# Patient Record
Sex: MALE | Race: ASIAN | NOT HISPANIC OR LATINO | URBAN - METROPOLITAN AREA
[De-identification: names, ages, dates, MRNs, and addresses within clinical notes are randomized per-mention and may not be internally consistent; named-entity substitution may affect disease eponyms.]

---

## 2024-07-27 ENCOUNTER — INPATIENT (INPATIENT)
Facility: HOSPITAL | Age: 89
LOS: 4 days | Discharge: NURSING, REHAB OR LONG-TERM CARE | DRG: 312 | End: 2024-08-01
Attending: STUDENT IN AN ORGANIZED HEALTH CARE EDUCATION/TRAINING PROGRAM | Admitting: STUDENT IN AN ORGANIZED HEALTH CARE EDUCATION/TRAINING PROGRAM
Payer: MEDICARE

## 2024-07-27 VITALS
HEIGHT: 62 IN | WEIGHT: 134.92 LBS | HEART RATE: 77 BPM | DIASTOLIC BLOOD PRESSURE: 63 MMHG | TEMPERATURE: 98 F | RESPIRATION RATE: 16 BRPM | OXYGEN SATURATION: 95 % | SYSTOLIC BLOOD PRESSURE: 133 MMHG

## 2024-07-27 DIAGNOSIS — R55 SYNCOPE AND COLLAPSE: ICD-10-CM

## 2024-07-27 DIAGNOSIS — C61 MALIGNANT NEOPLASM OF PROSTATE: ICD-10-CM

## 2024-07-27 DIAGNOSIS — E78.5 HYPERLIPIDEMIA, UNSPECIFIED: ICD-10-CM

## 2024-07-27 DIAGNOSIS — E11.9 TYPE 2 DIABETES MELLITUS WITHOUT COMPLICATIONS: ICD-10-CM

## 2024-07-27 DIAGNOSIS — Z29.9 ENCOUNTER FOR PROPHYLACTIC MEASURES, UNSPECIFIED: ICD-10-CM
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
## 2024-07-27 LAB
ALBUMIN SERPL ELPH-MCNC: 3.5 G/DL — SIGNIFICANT CHANGE UP (ref 3.5–5)
ALP SERPL-CCNC: 61 U/L — SIGNIFICANT CHANGE UP (ref 40–120)
ALT FLD-CCNC: 25 U/L DA — SIGNIFICANT CHANGE UP (ref 10–60)
ANION GAP SERPL CALC-SCNC: 5 MMOL/L — SIGNIFICANT CHANGE UP (ref 5–17)
APPEARANCE UR: CLEAR — SIGNIFICANT CHANGE UP
AST SERPL-CCNC: 27 U/L — SIGNIFICANT CHANGE UP (ref 10–40)
BASOPHILS # BLD AUTO: 0.02 K/UL — SIGNIFICANT CHANGE UP (ref 0–0.2)
BASOPHILS NFR BLD AUTO: 0.1 % — SIGNIFICANT CHANGE UP (ref 0–2)
BILIRUB SERPL-MCNC: 0.8 MG/DL — SIGNIFICANT CHANGE UP (ref 0.2–1.2)
BILIRUB UR-MCNC: NEGATIVE — SIGNIFICANT CHANGE UP
BUN SERPL-MCNC: 35 MG/DL — HIGH (ref 7–18)
CALCIUM SERPL-MCNC: 8.9 MG/DL — SIGNIFICANT CHANGE UP (ref 8.4–10.5)
CHLORIDE SERPL-SCNC: 112 MMOL/L — HIGH (ref 96–108)
CO2 SERPL-SCNC: 24 MMOL/L — SIGNIFICANT CHANGE UP (ref 22–31)
COLOR SPEC: YELLOW — SIGNIFICANT CHANGE UP
CREAT SERPL-MCNC: 1.32 MG/DL — HIGH (ref 0.5–1.3)
DIFF PNL FLD: ABNORMAL
EGFR: 50 ML/MIN/1.73M2 — LOW
EOSINOPHIL # BLD AUTO: 0 K/UL — SIGNIFICANT CHANGE UP (ref 0–0.5)
EOSINOPHIL NFR BLD AUTO: 0 % — SIGNIFICANT CHANGE UP (ref 0–6)
EPI CELLS # UR: PRESENT
GLUCOSE SERPL-MCNC: 191 MG/DL — HIGH (ref 70–99)
GLUCOSE UR QL: NEGATIVE MG/DL — SIGNIFICANT CHANGE UP
HCT VFR BLD CALC: 37 % — LOW (ref 39–50)
HGB BLD-MCNC: 12.6 G/DL — LOW (ref 13–17)
IMM GRANULOCYTES NFR BLD AUTO: 0.5 % — SIGNIFICANT CHANGE UP (ref 0–0.9)
KETONES UR-MCNC: ABNORMAL MG/DL
LEUKOCYTE ESTERASE UR-ACNC: NEGATIVE — SIGNIFICANT CHANGE UP
LYMPHOCYTES # BLD AUTO: 0.47 K/UL — LOW (ref 1–3.3)
LYMPHOCYTES # BLD AUTO: 3.1 % — LOW (ref 13–44)
MAGNESIUM SERPL-MCNC: 2.3 MG/DL — SIGNIFICANT CHANGE UP (ref 1.6–2.6)
MANUAL SMEAR VERIFICATION: SIGNIFICANT CHANGE UP
MCHC RBC-ENTMCNC: 32.4 PG — SIGNIFICANT CHANGE UP (ref 27–34)
MCHC RBC-ENTMCNC: 34.1 GM/DL — SIGNIFICANT CHANGE UP (ref 32–36)
MCV RBC AUTO: 95.1 FL — SIGNIFICANT CHANGE UP (ref 80–100)
MONOCYTES # BLD AUTO: 0.88 K/UL — SIGNIFICANT CHANGE UP (ref 0–0.9)
MONOCYTES NFR BLD AUTO: 5.7 % — SIGNIFICANT CHANGE UP (ref 2–14)
NEUTROPHILS # BLD AUTO: 13.9 K/UL — HIGH (ref 1.8–7.4)
NEUTROPHILS NFR BLD AUTO: 90.6 % — HIGH (ref 43–77)
NITRITE UR-MCNC: NEGATIVE — SIGNIFICANT CHANGE UP
NRBC # BLD: 0 /100 WBCS — SIGNIFICANT CHANGE UP (ref 0–0)
PH UR: 5.5 — SIGNIFICANT CHANGE UP (ref 5–8)
PLAT MORPH BLD: NORMAL — SIGNIFICANT CHANGE UP
PLATELET # BLD AUTO: 203 K/UL — SIGNIFICANT CHANGE UP (ref 150–400)
POTASSIUM SERPL-MCNC: 3.9 MMOL/L — SIGNIFICANT CHANGE UP (ref 3.5–5.3)
POTASSIUM SERPL-SCNC: 3.9 MMOL/L — SIGNIFICANT CHANGE UP (ref 3.5–5.3)
PROT SERPL-MCNC: 7 G/DL — SIGNIFICANT CHANGE UP (ref 6–8.3)
PROT UR-MCNC: ABNORMAL MG/DL
RBC # BLD: 3.89 M/UL — LOW (ref 4.2–5.8)
RBC # FLD: 13.1 % — SIGNIFICANT CHANGE UP (ref 10.3–14.5)
RBC BLD AUTO: NORMAL — SIGNIFICANT CHANGE UP
RBC CASTS # UR COMP ASSIST: 1 /HPF — SIGNIFICANT CHANGE UP (ref 0–4)
SODIUM SERPL-SCNC: 141 MMOL/L — SIGNIFICANT CHANGE UP (ref 135–145)
SP GR SPEC: 1.02 — SIGNIFICANT CHANGE UP (ref 1–1.03)
TROPONIN I, HIGH SENSITIVITY RESULT: 15.5 NG/L — SIGNIFICANT CHANGE UP
URATE CRY FLD QL MICRO: PRESENT
UROBILINOGEN FLD QL: 0.2 MG/DL — SIGNIFICANT CHANGE UP (ref 0.2–1)
WBC # BLD: 15.35 K/UL — HIGH (ref 3.8–10.5)
WBC # FLD AUTO: 15.35 K/UL — HIGH (ref 3.8–10.5)
WBC UR QL: 2 /HPF — SIGNIFICANT CHANGE UP (ref 0–5)

## 2024-07-27 PROCEDURE — 72170 X-RAY EXAM OF PELVIS: CPT | Mod: 26

## 2024-07-27 PROCEDURE — 99285 EMERGENCY DEPT VISIT HI MDM: CPT

## 2024-07-27 PROCEDURE — 70450 CT HEAD/BRAIN W/O DYE: CPT | Mod: 26,MC

## 2024-07-27 PROCEDURE — 99223 1ST HOSP IP/OBS HIGH 75: CPT | Mod: GC

## 2024-07-27 RX ORDER — BACTERIOSTATIC SODIUM CHLORIDE 0.9 %
1000 VIAL (ML) INJECTION ONCE
Refills: 0 | Status: COMPLETED | OUTPATIENT
Start: 2024-07-27 | End: 2024-07-27

## 2024-07-27 RX ADMIN — Medication 1000 MILLILITER(S): at 16:15

## 2024-07-27 NOTE — ED PROVIDER NOTE - OBJECTIVE STATEMENT
89-year-old male presenting after a fall.  Patient reports he was bending down to pick something up when he fell to the ground.  Denies being dizzy having any chest pain or trouble breathing.  States he currently has pain in his left forearm.  Denies being on any blood thinning medication. 89-year-old male, htn, DM, prostate cancer, presenting after a fall.  Patient reports he was bending down to pick something up when he fell to the ground.  Denies being dizzy having any chest pain or trouble breathing.  States he currently has pain in his left forearm.  Denies being on any blood thinning medication.

## 2024-07-27 NOTE — H&P ADULT - NSHPPHYSICALEXAM_GEN_ALL_CORE
General: well appearing male, no acute distress   	HEENT: normocephalic, atraumatic   	Respiratory: normal work of breathing  	Cardiac: regular rate and rhythm   	Abdomen: soft, non-tender, no guarding or rebound   	MSK: left forearm ecchymosis, non-tender, full left wrist and elbow ROM, pelvis stable, 5/5 strength in all extremities   	Skin: warm, dry   	Neuro: A&Ox3  Psych: appropriate affect General: well appearing male, no acute distress   HEENT: normocephalic, atraumatic   Respiratory: normal work of breathing  Cardiac: regular rate and rhythm   Abdomen: soft, non-tender, no guarding or rebound   MSK: R forearm ecchymosis, non-tender, full left wrist and elbow ROM, pelvis stable, 5/5 strength in all extremities   Skin: warm, dry   Neuro: A&Ox3  Psych: appropriate affect

## 2024-07-27 NOTE — H&P ADULT - HISTORY OF PRESENT ILLNESS
89-year-old male, htn, DM, prostate cancer, presenting after a fall.  Patient reports he was bending down to pick something up when he fell to the ground.  Denies being dizzy having any chest pain or trouble breathing.  States he currently has pain in his left forearm.  Denies being on any blood thinning medication. neighbor feels patient is not at his baseline and appeared confused. will admit patient for syncope workup. will have family member come to hospital tomorrow to assess the patient. RECENTLY started new medication?   Pt is a 89-year-old male, AAOx3, ambulates with a cane intermittently, w/ PMHx of DM, prostate cancer, presenting to the ED after 2 falls at home and near the grocery store earlier today. Per pt, he bent over to  a coin that fell to the ground and fell the first instance of falling. Later on in the day, he was bending down with his grocery bags and similarly fell again  He denies any dizziness, vertigo, lightheadedness, syncope, or LOC. Does endorse hitting his R forearm, where minor ecchymosis is noted on exam. However, ROM is fully intact. On exam, patient is comfortable when laying down, however on multiple attempts to ambulate, patient has unsteady gait. Romberg sign +. Denies any neuropathy. Pt has daughter who helps manage his care, she is planning to come in the next 2 days to take pt to [place] for further workup of his prostate cancer. Admitted to medicine for ambulatory dysfunction s/p fall.

## 2024-07-27 NOTE — ED ADULT NURSE NOTE - OBJECTIVE STATEMENT
Patient presented to the ED complaining of fall today. Patient states he was picking up something when he fell forward. Unknown if head strike. Patient denies dizziness or LOC.

## 2024-07-27 NOTE — H&P ADULT - NSICDXPASTMEDICALHX_GEN_ALL_CORE_FT
PAST MEDICAL HISTORY:  Diabetes mellitus     Hyperlipidemia     Prophylactic measure     Prostate cancer

## 2024-07-27 NOTE — ED ADULT NURSE NOTE - NSFALLHARMRISKINTERV_ED_ALL_ED

## 2024-07-27 NOTE — H&P ADULT - PROBLEM SELECTOR PLAN 1
s/p fall  imaging unremarkable  ROmber sign +  f/u orthostatic vs  f/u PT eval in AM  no syncope s/p fall x2  CTH: Small left frontal extracalvarial hematoma. if patient demonstrates persistent headaches or altered mental status, consider further evaluation  Pelvic XR: no remarkable findings, lower lumbar spine degeneration  on exam, Romberg sign +, unsteady gait  f/u orthostatic VS  f/u PT eval in AM  no evidence of syncope at this time

## 2024-07-27 NOTE — ED PROVIDER NOTE - CLINICAL SUMMARY MEDICAL DECISION MAKING FREE TEXT BOX
89M presenting after a fall. patient well appearing, grossly non-focal neurological exam. spoke with patient's daughter on the phone. patient has started a new medication. plans to return to [place] on Wednesday to get the patient. will obtain labs to assess for electrolyte abnormalities, ekg, ct head and xray pelvis.     daughters cell: [phone]

## 2024-07-27 NOTE — H&P ADULT - ATTENDING COMMENTS
IMAGING  CT Head  IMPRESSION:  Small left frontal extracalvarial hematoma. No acute intracranial hemorrhage, mass effect, or midline shift. If patient demonstrates persistent headaches or altered mental status, consider further evaluation via MR imaging to include DWI and ADC mapping techniques, along with gradient echo acquisition technique, provided there are no contraindications.    Pelvic X-Ray  IMPRESSION: No acute finding. Probable bone island in the right iliac bone. Degeneration particularly in the lower lumbar spine.    EKG  Sinus Rhythm with PACs, IRBBB, 63 bpm, QTc 421ms, NH 140ms, on my read no ST segment elevation or depression, no TWI    HPI  89 year old male patient with pmhx DM, prostate cancer, HTN, HLD who presented to the ER after falling twice.  The patient was at home and bent down to  a coin and fell forward. He later went to the store and coming home he was bending down with his grocery bags and again fell. He was found by someone who helped him back home by giving him a ride. His neighbor is a nurse and told him to go to the ER since he fell twice. The patient denies any lightheadedness, dizziness, orthostatic lightheadedness, palpitations, or any unusual signs before falling. He states he has imbalance chronically. In the ER attempted to ambulate patient but he almost fell after only 2 steps.    Review Of Systems included: + fall,   No lightheadedness, no dizziness, no orthostatic lightheadedness, no palpitations, no dysuria, no urinary frequency, no chest pain, no shortness of breath, no numbness, no weakness, no loss of consciousness    Physical Exam  General: Awake, Alert, Oriented x3  Cardiac: RRR  Pulmonary: CTA b/l  Abdominal: Soft, ND, NT  Neuro: CN II-XII intact, Muscle Strength +5/5 in upper and lower extremities b/l, Sensation intact, + Romberg sign when standing (falls backwards onto bed), + almost falls after walking only 2 steps, patient states he is able to feel the floor when walking and denies any numbness  Extremities: No edema, ecchymosis on right elbow but full range of motion and minimal pain    A/P  # Falls  # Ambulatory Dysfunction  > Inability to ambulate in the ER (fell when ambulation attempted at bedside)  > U/A with uric acid crystals but otherwise negative; patient denies any pain  - PT Evaluation  - Fall Precautions  - Check Orthostatic Vital Signs    # Uric acid crystals on U/A  - Outpatient follow up    # Reactive Leukocytosis  2/2 Fall  > U/A negative, no respiratory symptoms, infection not suspected    # Hx of DM  - Insulin Sliding Scale  - Blood Glucose Monitoring  - Can monitor blood glucose for 24 hours before starting long acting insulin if needed    # Hx of Prostate Cancer, HLD, HTN  - Continue home medications Bicalumatide, Statin, Lisinopril    # DVT PPx  - Heparin    # FEN  - Diabetic DASH Diet  - Monitor and replete electrolytes as needed  - Aspiration Precautions  - Fall Precautions    Patient case and management was discussed with ER Attending  I did examine all labs (including CBC, CMP, Mg, Troponin, UA), imaging, prior notes IMAGING  CT Head  IMPRESSION:  Small left frontal extracalvarial hematoma. No acute intracranial hemorrhage, mass effect, or midline shift. If patient demonstrates persistent headaches or altered mental status, consider further evaluation via MR imaging to include DWI and ADC mapping techniques, along with gradient echo acquisition technique, provided there are no contraindications.    Pelvic X-Ray  IMPRESSION: No acute finding. Probable bone island in the right iliac bone. Degeneration particularly in the lower lumbar spine.    EKG  Sinus Rhythm with PACs, IRBBB, 63 bpm, QTc 421ms, SC 140ms, on my read no ST segment elevation or depression, no TWI    HPI  89 year old male patient with pmhx DM, prostate cancer, HTN, HLD who presented to the ER after falling twice.  The patient was at home and bent down to  a coin and fell forward. He later went to the store and coming home he was bending down with his grocery bags and again fell. He was found by someone who helped him back home by giving him a ride. His neighbor is a nurse and told him to go to the ER since he fell twice. The patient denies any lightheadedness, dizziness, orthostatic lightheadedness, palpitations, or any unusual signs before falling. He states he has imbalance chronically. In the ER attempted to ambulate patient but he almost fell after only 2 steps.    Review Of Systems included: + fall,   No lightheadedness, no dizziness, no orthostatic lightheadedness, no palpitations, no dysuria, no urinary frequency, no chest pain, no shortness of breath, no numbness, no weakness, no loss of consciousness    Physical Exam  General: Awake, Alert, Oriented x3  Cardiac: RRR  Pulmonary: CTA b/l  Abdominal: Soft, ND, NT  Neuro: CN II-XII intact, Muscle Strength +5/5 in upper and lower extremities b/l, Sensation intact, + Romberg sign when standing (falls backwards onto bed), + almost falls after walking only 2 steps, patient states he is able to feel the floor when walking and denies any numbness  Extremities: No edema, ecchymosis on right elbow but full range of motion and minimal pain    A/P  # Falls  # Ambulatory Dysfunction  > Inability to ambulate in the ER (fell when ambulation attempted at bedside)  > U/A with uric acid crystals but otherwise negative; patient denies any pain  - PT Evaluation  - Fall Precautions  - Check Orthostatic Vital Signs    # Uric acid crystals on U/A  - Outpatient follow up    # Reactive Leukocytosis  2/2 Fall  > U/A negative, no respiratory symptoms, infection not suspected    # Hx of DM  - Insulin Sliding Scale  - Blood Glucose Monitoring  - Can monitor blood glucose for 24 hours before starting long acting insulin if needed    # Hx of Prostate Cancer, HLD, HTN  - Continue home medications Bicalumatide, Statin, Lisinopril    # DVT PPx  - Heparin    # FEN  - Diabetic DASH Diet  - Monitor and replete electrolytes as needed  - Aspiration Precautions  - Fall Precautions    Patient case and management was discussed with ER Attending  I did examine all labs (including CBC, CMP, Mg, Troponin, UA), imaging, prior notes    Time-based billing (NON-critical care).  76 minutes spent on total encounter excluding any time spent teaching residents. The necessity of the time spent during the encounter on this date of service was due to: Review of records, vital signs, labs, microbiology, and imaging, Ordering labs and/or tests, General physical examination performed, Generation of treatment plan, Counseling of the patient and/or family members

## 2024-07-27 NOTE — ED PROVIDER NOTE - PROGRESS NOTE DETAILS
updated patient's daughter and neighbor (Peggy) on patient's status. neighbor feels patient is not at his baseline and appeared confused. will admit patient for syncope workup. will have family member come to hospital tomorrow to assess the patient. amanda Lopez

## 2024-07-27 NOTE — ED ADULT TRIAGE NOTE - NS ED NURSE BANDS TYPE
Name band; Minoxidil Pregnancy And Lactation Text: This medication has not been assigned a Pregnancy Risk Category but animal studies failed to show danger with the topical medication. It is unknown if the medication is excreted in breast milk.

## 2024-07-27 NOTE — ED PROVIDER NOTE - PHYSICAL EXAMINATION
General: well appearing male, no acute distress   HEENT: normocephalic, atraumatic   Respiratory: normal work of breathing  Cardiac: regular rate and rhythm   Abdomen: soft, non-tender, no guarding or rebound   MSK: left forearm ecchymosis, non-tender, full left wrist and elbow ROM, pelvis stable, 5/5 strength in all extremities   Skin: warm, dry   Neuro: A&Ox3  Psych: appropriate affect

## 2024-07-27 NOTE — H&P ADULT - ASSESSMENT
89-year-old male, htn, DM, prostate cancer, presenting after a fall.  Patient reports he was bending down to pick something up when he fell to the ground.  Denies being dizzy having any chest pain or trouble breathing.  States he currently has pain in his left forearm.  Denies being on any blood thinning medication. neighbor feels patient is not at his baseline and appeared confused. will admit patient for syncope workup. will have family member come to hospital tomorrow to assess the patient. RECENTLY started new medication?    Adm for fall/ambulatory dysfx Pt is a 89-year-old male, AAOx3, ambulates with a cane intermittently, w/ PMHx of DM, prostate cancer, presenting to the ED after 2 falls at home and near the grocery store earlier today. Per pt, he bent over to  a coin that fell to the ground and fell the first instance of falling. Later on in the day, he was bending down with his grocery bags and similarly fell again  He denies any dizziness, vertigo, lightheadedness, syncope, or LOC. Does endorse hitting his R forearm, where minor ecchymosis is noted on exam. However, ROM is fully intact. On exam, patient is comfortable when laying down, however on multiple attempts to ambulate, patient has unsteady gait. Romberg sign +. Denies any neuropathy. Pt has daughter who helps manage his care, she is planning to come in the next 2 days to take pt to [place] for further workup of his prostate cancer. Admitted to medicine for ambulatory dysfunction s/p fall.     PRIMARY TEAM TO CONFIRM MED REC IN AM (MEDS OBTAINED FROM [facility])

## 2024-07-28 DIAGNOSIS — I10 ESSENTIAL (PRIMARY) HYPERTENSION: ICD-10-CM

## 2024-07-28 LAB
ANION GAP SERPL CALC-SCNC: 4 MMOL/L — LOW (ref 5–17)
BUN SERPL-MCNC: 28 MG/DL — HIGH (ref 7–18)
CALCIUM SERPL-MCNC: 8 MG/DL — LOW (ref 8.4–10.5)
CHLORIDE SERPL-SCNC: 112 MMOL/L — HIGH (ref 96–108)
CO2 SERPL-SCNC: 26 MMOL/L — SIGNIFICANT CHANGE UP (ref 22–31)
CREAT SERPL-MCNC: 1.18 MG/DL — SIGNIFICANT CHANGE UP (ref 0.5–1.3)
EGFR: 58 ML/MIN/1.73M2 — LOW
GLUCOSE BLDC GLUCOMTR-MCNC: 126 MG/DL — HIGH (ref 70–99)
GLUCOSE BLDC GLUCOMTR-MCNC: 156 MG/DL — HIGH (ref 70–99)
GLUCOSE BLDC GLUCOMTR-MCNC: 189 MG/DL — HIGH (ref 70–99)
GLUCOSE BLDC GLUCOMTR-MCNC: 234 MG/DL — HIGH (ref 70–99)
GLUCOSE SERPL-MCNC: 184 MG/DL — HIGH (ref 70–99)
HCT VFR BLD CALC: 33.3 % — LOW (ref 39–50)
HGB BLD-MCNC: 11.3 G/DL — LOW (ref 13–17)
MAGNESIUM SERPL-MCNC: 2.3 MG/DL — SIGNIFICANT CHANGE UP (ref 1.6–2.6)
MCHC RBC-ENTMCNC: 32.5 PG — SIGNIFICANT CHANGE UP (ref 27–34)
MCHC RBC-ENTMCNC: 33.9 GM/DL — SIGNIFICANT CHANGE UP (ref 32–36)
MCV RBC AUTO: 95.7 FL — SIGNIFICANT CHANGE UP (ref 80–100)
NRBC # BLD: 0 /100 WBCS — SIGNIFICANT CHANGE UP (ref 0–0)
PLATELET # BLD AUTO: 181 K/UL — SIGNIFICANT CHANGE UP (ref 150–400)
POTASSIUM SERPL-MCNC: 3.8 MMOL/L — SIGNIFICANT CHANGE UP (ref 3.5–5.3)
POTASSIUM SERPL-SCNC: 3.8 MMOL/L — SIGNIFICANT CHANGE UP (ref 3.5–5.3)
RBC # BLD: 3.48 M/UL — LOW (ref 4.2–5.8)
RBC # FLD: 13.4 % — SIGNIFICANT CHANGE UP (ref 10.3–14.5)
SODIUM SERPL-SCNC: 142 MMOL/L — SIGNIFICANT CHANGE UP (ref 135–145)
WBC # BLD: 9.51 K/UL — SIGNIFICANT CHANGE UP (ref 3.8–10.5)
WBC # FLD AUTO: 9.51 K/UL — SIGNIFICANT CHANGE UP (ref 3.8–10.5)

## 2024-07-28 PROCEDURE — 99232 SBSQ HOSP IP/OBS MODERATE 35: CPT

## 2024-07-28 RX ORDER — DORZOLAMIDE HYDROCHLORIDE TIMOLOL MALEATE 20; 5 MG/ML; MG/ML
1 SOLUTION/ DROPS OPHTHALMIC ONCE
Refills: 0 | Status: COMPLETED | OUTPATIENT
Start: 2024-07-28 | End: 2024-07-28

## 2024-07-28 RX ORDER — TAMSULOSIN HCL 0.4 MG
0.4 CAPSULE ORAL AT BEDTIME
Refills: 0 | Status: DISCONTINUED | OUTPATIENT
Start: 2024-07-28 | End: 2024-08-01

## 2024-07-28 RX ORDER — INSULIN LISPRO 100/ML
VIAL (ML) SUBCUTANEOUS
Refills: 0 | Status: DISCONTINUED | OUTPATIENT
Start: 2024-07-28 | End: 2024-08-01

## 2024-07-28 RX ORDER — DORZOLAMIDE HYDROCHLORIDE TIMOLOL MALEATE 20; 5 MG/ML; MG/ML
1 SOLUTION/ DROPS OPHTHALMIC ONCE
Refills: 0 | Status: DISCONTINUED | OUTPATIENT
Start: 2024-07-28 | End: 2024-07-28

## 2024-07-28 RX ORDER — BICALUTAMIDE 50 MG/1
50 TABLET, FILM COATED ORAL DAILY
Refills: 0 | Status: DISCONTINUED | OUTPATIENT
Start: 2024-07-28 | End: 2024-08-01

## 2024-07-28 RX ORDER — ATENOLOL 100 MG/1
25 TABLET ORAL DAILY
Refills: 0 | Status: DISCONTINUED | OUTPATIENT
Start: 2024-07-28 | End: 2024-07-28

## 2024-07-28 RX ORDER — ATENOLOL 100 MG/1
25 TABLET ORAL DAILY
Refills: 0 | Status: DISCONTINUED | OUTPATIENT
Start: 2024-07-28 | End: 2024-08-01

## 2024-07-28 RX ORDER — LOVASTATIN 40 MG/1
1 TABLET ORAL
Refills: 0 | DISCHARGE

## 2024-07-28 RX ORDER — LISINOPRIL 10 MG/1
1 TABLET ORAL
Refills: 0 | DISCHARGE

## 2024-07-28 RX ORDER — DORZOLAMIDE HYDROCHLORIDE TIMOLOL MALEATE 20; 5 MG/ML; MG/ML
1 SOLUTION/ DROPS OPHTHALMIC
Refills: 0 | Status: DISCONTINUED | OUTPATIENT
Start: 2024-07-28 | End: 2024-08-01

## 2024-07-28 RX ORDER — ACETAMINOPHEN 500 MG
650 TABLET ORAL EVERY 6 HOURS
Refills: 0 | Status: DISCONTINUED | OUTPATIENT
Start: 2024-07-28 | End: 2024-08-01

## 2024-07-28 RX ORDER — INSULIN LISPRO 100/ML
VIAL (ML) SUBCUTANEOUS AT BEDTIME
Refills: 0 | Status: DISCONTINUED | OUTPATIENT
Start: 2024-07-28 | End: 2024-08-01

## 2024-07-28 RX ORDER — ATENOLOL 100 MG/1
1 TABLET ORAL
Refills: 0 | DISCHARGE

## 2024-07-28 RX ORDER — BICALUTAMIDE 50 MG/1
1 TABLET, FILM COATED ORAL
Refills: 0 | DISCHARGE

## 2024-07-28 RX ORDER — ATORVASTATIN CALCIUM 40 MG/1
20 TABLET, FILM COATED ORAL AT BEDTIME
Refills: 0 | Status: DISCONTINUED | OUTPATIENT
Start: 2024-07-28 | End: 2024-08-01

## 2024-07-28 RX ORDER — DEXTROSE MONOHYDRATE, SODIUM CHLORIDE, SODIUM LACTATE, CALCIUM CHLORIDE, MAGNESIUM CHLORIDE 1.5; 538; 448; 18.4; 5.08 G/100ML; MG/100ML; MG/100ML; MG/100ML; MG/100ML
1000 SOLUTION INTRAPERITONEAL
Refills: 0 | Status: DISCONTINUED | OUTPATIENT
Start: 2024-07-28 | End: 2024-08-01

## 2024-07-28 RX ORDER — HEPARIN SODIUM 1000 [USP'U]/ML
5000 INJECTION, SOLUTION INTRAVENOUS; SUBCUTANEOUS EVERY 12 HOURS
Refills: 0 | Status: DISCONTINUED | OUTPATIENT
Start: 2024-07-28 | End: 2024-08-01

## 2024-07-28 RX ADMIN — DORZOLAMIDE HYDROCHLORIDE TIMOLOL MALEATE 1 DROP(S): 20; 5 SOLUTION/ DROPS OPHTHALMIC at 10:44

## 2024-07-28 RX ADMIN — Medication 2: at 11:54

## 2024-07-28 RX ADMIN — HEPARIN SODIUM 5000 UNIT(S): 1000 INJECTION, SOLUTION INTRAVENOUS; SUBCUTANEOUS at 05:23

## 2024-07-28 RX ADMIN — ATORVASTATIN CALCIUM 20 MILLIGRAM(S): 40 TABLET, FILM COATED ORAL at 21:41

## 2024-07-28 RX ADMIN — Medication 0.4 MILLIGRAM(S): at 21:41

## 2024-07-28 RX ADMIN — BICALUTAMIDE 50 MILLIGRAM(S): 50 TABLET, FILM COATED ORAL at 11:54

## 2024-07-28 RX ADMIN — HEPARIN SODIUM 5000 UNIT(S): 1000 INJECTION, SOLUTION INTRAVENOUS; SUBCUTANEOUS at 17:48

## 2024-07-28 RX ADMIN — Medication 1: at 08:09

## 2024-07-28 NOTE — PROGRESS NOTE ADULT - SUBJECTIVE AND OBJECTIVE BOX
[facility] Medicine  Patient is a 89y old  Male who presents with a chief complaint of Ambulatory Dysfunction (27 Jul 2024 19:58)      SUBJECTIVE / OVERNIGHT EVENTS:  No acute events over night. Denies any fevers/chills, headache, CP, SOB, abd pain, N/V/D, constipation, or leg swelling.       MEDICATIONS  (STANDING):  atenolol  Tablet 25 milliGRAM(s) Oral daily  atorvastatin 20 milliGRAM(s) Oral at bedtime  bicalutamide 50 milliGRAM(s) Oral daily  dextrose 5%. 1000 milliLiter(s) (50 mL/Hr) IV Continuous <Continuous>  dorzolamide 2%/timolol 0.5% Ophthalmic Solution 1 Drop(s) Both EYES <User Schedule>  heparin   Injectable 5000 Unit(s) SubCutaneous every 12 hours  insulin lispro (ADMELOG) corrective regimen sliding scale   SubCutaneous at bedtime  insulin lispro (ADMELOG) corrective regimen sliding scale   SubCutaneous three times a day before meals  tamsulosin 0.4 milliGRAM(s) Oral at bedtime    MEDICATIONS  (PRN):  acetaminophen     Tablet .. 650 milliGRAM(s) Oral every 6 hours PRN Temp greater or equal to 38C (100.4F), Mild Pain (1 - 3)          OBJECTIVE:  Vital Signs Last 24 Hrs  T(C): 37.3 (28 Jul 2024 13:59), Max: 37.6 (28 Jul 2024 00:15)  T(F): 99.1 (28 Jul 2024 13:59), Max: 99.7 (28 Jul 2024 00:15)  HR: 65 (28 Jul 2024 13:59) (61 - 77)  BP: 137/82 (28 Jul 2024 12:35) (104/63 - 169/70)  BP(mean): --  RR: 16 (28 Jul 2024 13:59) (16 - 18)  SpO2: 100% (28 Jul 2024 13:59) (95% - 100%)    Parameters below as of 28 Jul 2024 13:59  Patient On (Oxygen Delivery Method): room air      PHYSICAL EXAM:  GENERAL: NAD,   HEAD:  Atraumatic, Normocephalic  EYES: conjunctiva and sclera clear  NECK: Supple, No JVD  CHEST/LUNG: Clear to auscultation bilaterally; No wheeze  HEART: Regular rate and rhythm; No murmurs, rubs, or gallops  ABDOMEN: Soft, Nontender, Nondistended; Bowel sounds present  EXTREMITIES:  No clubbing, cyanosis, or edema  PSYCH: AAOx3  NEUROLOGY: non-focal  SKIN: No rashes or lesions    CAPILLARY BLOOD GLUCOSE      POCT Blood Glucose.: 234 mg/dL (28 Jul 2024 11:47)  POCT Blood Glucose.: 189 mg/dL (28 Jul 2024 07:56)  POCT Blood Glucose.: 182 mg/dL (27 Jul 2024 15:20)    I&O's Summary    27 Jul 2024 07:01  -  28 Jul 2024 07:00  --------------------------------------------------------  IN: 0 mL / OUT: 500 mL / NET: -500 mL    28 Jul 2024 07:01  -  28 Jul 2024 14:38  --------------------------------------------------------  IN: 400 mL / OUT: 0 mL / NET: 400 mL              LABS:                        11.3   9.51  )-----------( 181      ( 28 Jul 2024 05:45 )             33.3     07-28    142  |  112<H>  |  28<H>  ----------------------------<  184<H>  3.8   |  26  |  1.18    Ca    8.0<L>      28 Jul 2024 05:45  Mg     2.3     07-28    TPro  7.0  /  Alb  3.5  /  TBili  0.8  /  DBili  x   /  AST  27  /  ALT  25  /  AlkPhos  61  07-27          Urinalysis Basic - ( 28 Jul 2024 05:45 )    Color: x / Appearance: x / SG: x / pH: x  Gluc: 184 mg/dL / Ketone: x  / Bili: x / Urobili: x   Blood: x / Protein: x / Nitrite: x   Leuk Esterase: x / RBC: x / WBC x   Sq Epi: x / Non Sq Epi: x / Bacteria: x          Urinalysis with Rflx Culture (collected 27 Jul 2024 21:15)        RADIOLOGY & ADDITIONAL TESTS:

## 2024-07-28 NOTE — PHYSICAL THERAPY INITIAL EVALUATION ADULT - ADDITIONAL COMMENTS
Patient lives in a building apartment with elevator access; lives alone.  Patient is independent with transfers, ADLs and ambulation with straight cane.

## 2024-07-29 DIAGNOSIS — Z75.8 OTHER PROBLEMS RELATED TO MEDICAL FACILITIES AND OTHER HEALTH CARE: ICD-10-CM

## 2024-07-29 LAB
ANION GAP SERPL CALC-SCNC: 5 MMOL/L — SIGNIFICANT CHANGE UP (ref 5–17)
BUN SERPL-MCNC: 27 MG/DL — HIGH (ref 7–18)
CALCIUM SERPL-MCNC: 8.4 MG/DL — SIGNIFICANT CHANGE UP (ref 8.4–10.5)
CHLORIDE SERPL-SCNC: 112 MMOL/L — HIGH (ref 96–108)
CO2 SERPL-SCNC: 25 MMOL/L — SIGNIFICANT CHANGE UP (ref 22–31)
CREAT SERPL-MCNC: 1.13 MG/DL — SIGNIFICANT CHANGE UP (ref 0.5–1.3)
EGFR: 61 ML/MIN/1.73M2 — SIGNIFICANT CHANGE UP
GLUCOSE BLDC GLUCOMTR-MCNC: 139 MG/DL — HIGH (ref 70–99)
GLUCOSE BLDC GLUCOMTR-MCNC: 151 MG/DL — HIGH (ref 70–99)
GLUCOSE BLDC GLUCOMTR-MCNC: 178 MG/DL — HIGH (ref 70–99)
GLUCOSE BLDC GLUCOMTR-MCNC: 307 MG/DL — HIGH (ref 70–99)
GLUCOSE SERPL-MCNC: 145 MG/DL — HIGH (ref 70–99)
HCT VFR BLD CALC: 35.1 % — LOW (ref 39–50)
HGB BLD-MCNC: 11.4 G/DL — LOW (ref 13–17)
MAGNESIUM SERPL-MCNC: 2.2 MG/DL — SIGNIFICANT CHANGE UP (ref 1.6–2.6)
MCHC RBC-ENTMCNC: 32.5 GM/DL — SIGNIFICANT CHANGE UP (ref 32–36)
MCHC RBC-ENTMCNC: 32.5 PG — SIGNIFICANT CHANGE UP (ref 27–34)
MCV RBC AUTO: 100 FL — SIGNIFICANT CHANGE UP (ref 80–100)
NRBC # BLD: 0 /100 WBCS — SIGNIFICANT CHANGE UP (ref 0–0)
PLATELET # BLD AUTO: 180 K/UL — SIGNIFICANT CHANGE UP (ref 150–400)
POTASSIUM SERPL-MCNC: 4 MMOL/L — SIGNIFICANT CHANGE UP (ref 3.5–5.3)
POTASSIUM SERPL-SCNC: 4 MMOL/L — SIGNIFICANT CHANGE UP (ref 3.5–5.3)
RBC # BLD: 3.51 M/UL — LOW (ref 4.2–5.8)
RBC # FLD: 13.7 % — SIGNIFICANT CHANGE UP (ref 10.3–14.5)
SODIUM SERPL-SCNC: 142 MMOL/L — SIGNIFICANT CHANGE UP (ref 135–145)
WBC # BLD: 6.26 K/UL — SIGNIFICANT CHANGE UP (ref 3.8–10.5)
WBC # FLD AUTO: 6.26 K/UL — SIGNIFICANT CHANGE UP (ref 3.8–10.5)

## 2024-07-29 PROCEDURE — 99232 SBSQ HOSP IP/OBS MODERATE 35: CPT

## 2024-07-29 RX ORDER — DORZOLAMIDE HYDROCHLORIDE TIMOLOL MALEATE 20; 5 MG/ML; MG/ML
1 SOLUTION/ DROPS OPHTHALMIC
Refills: 0 | DISCHARGE

## 2024-07-29 RX ADMIN — HEPARIN SODIUM 5000 UNIT(S): 1000 INJECTION, SOLUTION INTRAVENOUS; SUBCUTANEOUS at 05:23

## 2024-07-29 RX ADMIN — ATENOLOL 25 MILLIGRAM(S): 100 TABLET ORAL at 05:23

## 2024-07-29 RX ADMIN — Medication 1: at 08:12

## 2024-07-29 RX ADMIN — Medication 4: at 11:52

## 2024-07-29 RX ADMIN — ATORVASTATIN CALCIUM 20 MILLIGRAM(S): 40 TABLET, FILM COATED ORAL at 21:13

## 2024-07-29 RX ADMIN — Medication 0.4 MILLIGRAM(S): at 21:13

## 2024-07-29 RX ADMIN — HEPARIN SODIUM 5000 UNIT(S): 1000 INJECTION, SOLUTION INTRAVENOUS; SUBCUTANEOUS at 17:08

## 2024-07-29 RX ADMIN — BICALUTAMIDE 50 MILLIGRAM(S): 50 TABLET, FILM COATED ORAL at 11:52

## 2024-07-29 RX ADMIN — DORZOLAMIDE HYDROCHLORIDE TIMOLOL MALEATE 1 DROP(S): 20; 5 SOLUTION/ DROPS OPHTHALMIC at 09:39

## 2024-07-29 NOTE — PROGRESS NOTE ADULT - NS ATTEND AMEND GEN_ALL_CORE FT
Patient seen and examined this afternoon around 1.30 PM     89 year old male patient with pmhx DM, prostate cancer, HTN, HLD who presented to the ER after falling twice.The patient was at home and bent down to  a coin and fell forward. He later went to the store and coming home he was bending down with his grocery bags and again fell. He was found by someone who helped him back home by giving him a ride. His neighbor is a nurse and told him to go to the ER since he fell twice. The patient denies any lightheadedness, dizziness, orthostatic lightheadedness, palpitations, or any unusual signs before falling. He states he has imbalance chronically.    Vital Signs Last 24 Hrs  T(C): 37 (29 Jul 2024 13:17), Max: 37.2 (28 Jul 2024 21:03)  T(F): 98.6 (29 Jul 2024 13:17), Max: 99 (28 Jul 2024 21:03)  HR: 62 (29 Jul 2024 13:17) (57 - 62)  BP: 120/99 (29 Jul 2024 13:17) (120/99 - 142/58)  RR: 16 (29 Jul 2024 13:17) (16 - 17)  SpO2: 99% (29 Jul 2024 13:17) (98% - 99%) room air    P/E:  Psych: AAO x3  Neuro: No gross focal deficits; Power and sensation intact  CVS: S1S2 present, regular, no edema  Resp: BLAE+, No wheeze or Rhonchi  GI: Soft, BS+, Non tender, non distended  Extr: No  calf tenderness B/L Lower extremities  Skin: Warm and moist without any rashes    Labs:        # Falls  # Ambulatory Dysfunction  # Uric acid crystals on U/A  # Reactive Leukocytosis 2/2 Fall  # Hx of DM  # Hx of Prostate Cancer, HLD, HTN    > Inability to ambulate in the ER (fell when ambulation attempted at bedside)  > U/A with uric acid crystals but otherwise negative; patient denies any pain  - PT Evaluation - rec EDUARD - CM consult  - Fall Precautions  - fu Orthostatic Vital Signs  - Outpatient follow up  - Insulin Sliding Scale  - Blood Glucose Monitoring  - Can monitor blood glucose for 24 hours before starting long acting insulin if needed  - Continue home medications Bicalumatide, Statin, Lisinopril  - DVT PPx:  Heparin SQ  - Diabetic DASH Diet  - Monitor and replete electrolytes as needed  - Aspiration/ Fall  Precautions Patient seen and examined this afternoon around 1.30 PM     89 year old male patient with pmhx DM, prostate cancer, HTN, HLD who presented to the ER after falling twice.The patient was at home and bent down to  a coin and fell forward. He later went to the store and coming home he was bending down with his grocery bags and again fell. He was found by someone who helped him back home by giving him a ride. His neighbor is a nurse and told him to go to the ER since he fell twice. The patient denies any lightheadedness, dizziness, orthostatic lightheadedness, palpitations, or any unusual signs before falling. He states he has imbalance chronically.    Patient seen by PT recommended EDUARD; as per patient daughter coming on Wednesday to take to [place] where he goes during the winter  OOB to chair. no complaints    Vital Signs Last 24 Hrs  T(C): 37 (29 Jul 2024 13:17), Max: 37.2 (28 Jul 2024 21:03)  T(F): 98.6 (29 Jul 2024 13:17), Max: 99 (28 Jul 2024 21:03)  HR: 62 (29 Jul 2024 13:17) (57 - 62)  BP: 120/99 (29 Jul 2024 13:17) (120/99 - 142/58)  RR: 16 (29 Jul 2024 13:17) (16 - 17)  SpO2: 99% (29 Jul 2024 13:17) (98% - 99%) room air    P/E:  Psych: AAO x3  Neuro: No gross focal deficits; Power and sensation intact  CVS: S1S2 present, regular, no edema  Resp: BLAE+, No wheeze or Rhonchi  GI: Soft, BS+, Non tender, non distended  Extr: No  calf tenderness B/L Lower extremities  Skin: Warm and moist without any rashes    Labs:             11.4   6.26  )-----------( 180      ( 29 Jul 2024 05:04 )             35.1     07-29    142  |  112<H>  |  27<H>  ----------------------------<  145<H>  4.0   |  25  |  1.13    Ca    8.4      29 Jul 2024 05:04  Mg     2.2     07-29      # Falls  # Ambulatory Dysfunction  # Uric acid crystals on U/A  # Reactive Leukocytosis 2/2 Fall  # Hx of DM  # Hx of Prostate Cancer, HLD, HTN    Plan:  PT evl appreciated; EDUARD recommended  Patient wishes to go home with Daughter to [facility]/  follow up for discharge disposition  Check Orthostatic BP and HR  U/A with uric acid crystals unclear if needs any Rx as asymptomatic  Insulin Sliding Scale; Accuchek ACHS; - Diabetic DASH Diet  Add on A1c;   Continue home medications Bicalutamide, Statin, Lisinopril  DVT PPx:  Heparin SQ  Monitor and replete electrolytes as needed  Aspiration/ Fall  Precautions    Discussed with ACP Anjelica  Discussed with Patient  Hospitalist Colleague to assume care AM.

## 2024-07-29 NOTE — PROGRESS NOTE ADULT - PROBLEM SELECTOR PLAN 1
s/p fall x2  CTH: Small left frontal extracalvarial hematoma. if patient demonstrates persistent headaches or altered mental status, consider further evaluation  Pelvic XR: no remarkable findings, lower lumbar spine degeneration  on exam, Romberg sign +, unsteady gait  PT: EDUARD , however patient declines

## 2024-07-29 NOTE — DISCHARGE NOTE PROVIDER - CARE PROVIDER_API CALL
FLAQUITA CRUZ, TJ  [address]  [place] [address]  Phone: [phone]  Fax: [phone]  Follow Up Time: 1 week   FLAQUITA CRUZ, [facility]  [address]  [place] [address]  Phone: [phone]  Fax: [phone]  Follow Up Time: 1 week    Facility MD at [facility],   Phone: (   )    -  Fax: (   )    -  Follow Up Time: 1-3 days

## 2024-07-29 NOTE — DISCHARGE NOTE PROVIDER - NSDCCPCAREPLAN_GEN_ALL_CORE_FT
PRINCIPAL DISCHARGE DIAGNOSIS  Diagnosis: Gait instability  Assessment and Plan of Treatment: you came into the ER after falling twice and you have a history of imbalance issues.   you were recommended for rehab        SECONDARY DISCHARGE DIAGNOSES  Diagnosis: Leukocytosis  Assessment and Plan of Treatment: you were found to have elevated white blood cell count due to recent fall  follow up with your Primary Care Dr. Knapp to check your white blood cell count in 1 week.    Diagnosis: High urine uric acid level  Assessment and Plan of Treatment: you had a urinalysis that showed elevated uric acid levels in your urine.   please follow up with your Primary Care    Diagnosis: Prostate cancer  Assessment and Plan of Treatment:     Diagnosis: Hypertension  Assessment and Plan of Treatment:     Diagnosis: Diabetes mellitus  Assessment and Plan of Treatment:     Diagnosis: Hyperlipidemia  Assessment and Plan of Treatment:      PRINCIPAL DISCHARGE DIAGNOSIS  Diagnosis: Lumbar degenerative disc disease  Assessment and Plan of Treatment: you came into the ER after falling twice and you have a history of imbalance issues.   you were recommended for rehab  Degenerative disc disease happens when one or more discs between the vertebrae (bones in your spine) wear down. Discs act like a cushion between your vertebrae and help to stabilize your spine. Degenerative disc disease commonly occurs in the neck or lower back as you get older.  Seek care immediately if:  You have severe pain or weakness, or you cannot move your arm or leg.  You lose control of your bladder or bowels.  Call your doctor or orthopedist if:  Your pain gets worse, or you cannot control it with pain medicine.        SECONDARY DISCHARGE DIAGNOSES  Diagnosis: Traumatic hematoma of head  Assessment and Plan of Treatment: you had a CT scan of your head that showed small left frontal hematoma due to recent head injury from a fall   seek help immediately if you have any worsening headache, dizziness, blurry vision or nausea.   follow up with your Primary Care doctor    Diagnosis: Leukocytosis  Assessment and Plan of Treatment: you were found to have elevated white blood cell count due to recent fall  follow up with your Primary Care Dr. Knapp to check your white blood cell count in 1 week.    Diagnosis: High urine uric acid level  Assessment and Plan of Treatment: you had a urinalysis that showed elevated uric acid levels in your urine.   please follow up with your Primary Care  A low-purine diet is an eating plan that limits foods with high purine. Purines are a natural substance found in some foods. Purines aren’t all bad, but you want to avoid high amounts. When your body digests purine, it produces a waste product called uric acid. A buildup of uric acid crystals in the joints can cause certain health issues. The main ones are kidney stones and gout, a type of arthritis.  To get started, increase your liquid intake. Each day, drink 8 to 16 eight-ounce cups of liquid. At least half of the liquid you drink should be water. Water and other liquids help your body get rid of uric acid.  Avoid foods high in fat (red meats, fatty poultry—dark meats and skin, high-fat dairy products)      Diagnosis: Prostate cancer  Assessment and Plan of Treatment: continue taking your home medications - Bicalumatide 50 mg daily and Flomax.    Diagnosis: Hypertension  Assessment and Plan of Treatment: continue home medication lisinopril 10 mg daily    Diagnosis: Diabetes mellitus  Assessment and Plan of Treatment: a1c -    Diagnosis: Hyperlipidemia  Assessment and Plan of Treatment: continue taking lovastatin 20 mg daily     PRINCIPAL DISCHARGE DIAGNOSIS  Diagnosis: Lumbar degenerative disc disease  Assessment and Plan of Treatment: you came into the ER after falling twice and you have a history of imbalance issues.   X- ray of your pelvis showed degenrative changes particularly in the lower lumbar spine.  you were recommended for rehab  Degenerative disc disease happens when one or more discs between the vertebrae (bones in your spine) wear down. Discs act like a cushion between your vertebrae and help to stabilize your spine. Degenerative disc disease commonly occurs in the neck or lower back as you get older.  Seek care immediately if:  You have severe pain or weakness, or you cannot move your arm or leg.  You lose control of your bladder or bowels.  Call your doctor or orthopedist if:  Your pain gets worse, or you cannot control it with pain medicine.        SECONDARY DISCHARGE DIAGNOSES  Diagnosis: Prostate cancer  Assessment and Plan of Treatment: continue taking your home medications - Bicalumatide 50 mg daily and Flomax.  As reported by your daughter, you will likely follow up icare for your prostate cancer n [place].    Diagnosis: Hyperlipidemia  Assessment and Plan of Treatment: continue taking lovastatin 20 mg daily    Diagnosis: Diabetes mellitus  Assessment and Plan of Treatment: Your hemoglobin A1C is 7.4 which is mildly elevated for diabetic patient.  Take diabetes medications as prescribed  Check your blood sugars 3x/day    Diagnosis: Hypertension  Assessment and Plan of Treatment: continue home medication lisinopril 10 mg daily    Diagnosis: Leukocytosis  Assessment and Plan of Treatment: you were found to have elevated white blood cell count due to recent fall  follow up with your Primary Care Dr. Knapp to check your white blood cell count in 1 week.    Diagnosis: High urine uric acid level  Assessment and Plan of Treatment: you had a urinalysis that showed elevated uric acid levels in your urine.   please follow up with your Primary Care  A low-purine diet is an eating plan that limits foods with high purine. Purines are a natural substance found in some foods. Purines aren’t all bad, but you want to avoid high amounts. When your body digests purine, it produces a waste product called uric acid. A buildup of uric acid crystals in the joints can cause certain health issues. The main ones are kidney stones and gout, a type of arthritis.  To get started, increase your liquid intake. Each day, drink 8 to 16 eight-ounce cups of liquid. At least half of the liquid you drink should be water. Water and other liquids help your body get rid of uric acid.  Avoid foods high in fat (red meats, fatty poultry—dark meats and skin, high-fat dairy products)      Diagnosis: Traumatic hematoma of head  Assessment and Plan of Treatment: you had a CT scan of your head that showed small left frontal hematoma due to recent head injury from a fall   seek help immediately if you have any worsening headache, dizziness, blurry vision or nausea.   follow up with your Primary Care doctor     PRINCIPAL DISCHARGE DIAGNOSIS  Diagnosis: Lumbar degenerative disc disease  Assessment and Plan of Treatment: you came into the ER after falling twice and you have a history of imbalance issues.   X- ray of your pelvis showed degenrative changes particularly in the lower lumbar spine.  you were recommended for rehab  continue physical therapy at nursing facility.   Follow-up with your primary care physician.  When walking: ensure proper footwear, adequate lighting ,avoid loose rugs and clutter in walkway.   Ensure adequate rest ,nutrition, and hydration.  Degenerative disc disease happens when one or more discs between the vertebrae (bones in your spine) wear down. Discs act like a cushion between your vertebrae and help to stabilize your spine. Degenerative disc disease commonly occurs in the neck or lower back as you get older.  Seek care immediately if:  You have severe pain or weakness, or you cannot move your arm or leg.  You lose control of your bladder or bowels.  Call your doctor or orthopedist if:  Your pain gets worse, or you cannot control it with pain medicine.        SECONDARY DISCHARGE DIAGNOSES  Diagnosis: Prostate cancer  Assessment and Plan of Treatment: continue taking your home medications - Bicalumatide 50 mg daily and Flomax.  As reported by your daughter, you will likely follow up care for your prostate cancer in [place].    Diagnosis: Diabetes mellitus  Assessment and Plan of Treatment: Your hemoglobin A1C is 7.4 which is mildly elevated for diabetic patient.  Take diabetes medications as prescribed, Metformin  Check your blood sugars 3x/day  It's important not to skip any meals.  If you have not seen your ophthalmologist this year call for appointment.  Check your feet daily for redness, sores, or openings. Do not self treat. If no improvement in two days call your primary care physician for an appointment.  Low blood sugar (hypoglycemia) is a blood sugar below 70mg/dl. Check your blood sugar if you feel signs/symptoms of hypoglycemia. If your blood sugar is below 70 take 15 grams of carbohydrates (ex 4 oz of apple juice, 3-4 glucose tablets, or 4-6 oz of regular soda) wait 15 minutes and repeat blood sugar to make sure it comes up above 70.  If your blood sugar is above 70 and you are due for a meal, have a meal.  If you are not due for a meal have a snack.  This snack helps keeps your blood sugar at a safe range.      Diagnosis: Hypertension  Assessment and Plan of Treatment: continue home medication lisinopril 10 mg daily and atenolol 25mg daily.   Low salt diet  Activity as tolerated.  Follow up with your medical doctor for routine blood pressure monitoring.   Notify your doctor if you have any of the following symptoms:   Dizziness, Lightheadedness, Blurry vision, Headache, Chest pain, Shortness of breath      Diagnosis: Hyperlipidemia  Assessment and Plan of Treatment: continue taking lovastatin 20 mg daily  Follow up with PCP for treatment goals, continue medication, have liver function testing every 3 months as anti lipid medications can cause liver irritation, eat low fat, low cholesterol meals      Diagnosis: Leukocytosis  Assessment and Plan of Treatment: you were found to have elevated white blood cell count due to recent fall, now resolved.  Follow up with your facility MD and/or primary doctor upon discharge.    Diagnosis: High urine uric acid level  Assessment and Plan of Treatment: you had a urinalysis that showed elevated uric acid levels in your urine.   please follow up with your Primary Care /facility MD upon discharge.   A low-purine diet is an eating plan that limits foods with high purine. Purines are a natural substance found in some foods. Purines aren’t all bad, but you want to avoid high amounts. When your body digests purine, it produces a waste product called uric acid. A buildup of uric acid crystals in the joints can cause certain health issues. The main ones are kidney stones and gout, a type of arthritis.  To get started, increase your liquid intake. Each day, drink 8 to 16 eight-ounce cups of liquid. At least half of the liquid you drink should be water. Water and other liquids help your body get rid of uric acid.  Avoid foods high in fat (red meats, fatty poultry—dark meats and skin, high-fat dairy products)      Diagnosis: Traumatic hematoma of head  Assessment and Plan of Treatment: you had a CT scan of your head that showed small left frontal hematoma due to recent head injury from a fall   seek help immediately if you have any worsening headache, dizziness, blurry vision or nausea.   follow up with your Primary Care doctor    Diagnosis: Loose bowel movement  Assessment and Plan of Treatment: Reported loose bowel movement likely due to medication use (metformin, Bicalumatide). started probiotic. Monitor for diarrhea. If symptoms persist, report to your facility MD and/or PCP.    Diagnosis: Pedal edema  Assessment and Plan of Treatment: You were monitored for trace pedal edema, likely dependant. Do not stay in bed too long. Elevate your lower legs while in bed and/or sitting in the chair. Ambulate with assistance as you tolerated.  If symptoms persist, report your faciltiy MD or PCP for further evaluation.    Diagnosis: NETTE (acute kidney injury)  Assessment and Plan of Treatment: Your serum creatinine elevated on admission. now resolved.   Your blood pressure pill lisinopril was held due to it now you can restart your medication upon discharge.   Follow up with facility MD and/or PCP to monitor kidney functions.   Acute kidney injury (NETTE) is when the kidneys suddenly stop working effectively.  This can happen when there is less blood flow to the kidneys, there is kidney damage or the path for urine to leave the body is blocked.  NETTE can cause decreased urination, blood in the urine, swelling, vomiting, weakness, confusion and/or seisures.  The treatment depends on the cause and severity of the injury.  In any case, while your kidneys are healing you should avoid agents that can cause kidney injury.  Some of these agents include NSAIDs, Gadolinium contrast, and Phosphate containing enemas.

## 2024-07-29 NOTE — DISCHARGE NOTE PROVIDER - HOSPITAL COURSE
89-year-old male, AAOx3, ambulates with a cane intermittently, w/ PMHx of DM, prostate cancer, presenting to the ED after 2 falls at home and near the grocery store earlier today. Per pt, he bent over to  a coin that fell to the ground and fell the first instance of falling. Later on in the day, he was bending down with his grocery bags and similarly fell again  He denies any dizziness, vertigo, lightheadedness, syncope, or LOC. Does endorse hitting his R forearm, where minor ecchymosis is noted on exam. However, ROM is fully intact. On exam, patient is comfortable when laying down, however on multiple attempts to ambulate, patient has unsteady gait. Romberg sign +. Denies any neuropathy. Pt has daughter who helps manage his care, she is planning to come in the next 2 days to take pt to [place] for further workup of his prostate cancer. Admitted to medicine for ambulatory dysfunction s/p fall.    EKG  Sinus Rhythm with PACs, IRBBB, 63 bpm, QTc 421ms, [place] 140ms, on my read no ST segment elevation or depression, no TWI    CTH: Small left frontal extracalvarial hematoma.  Pelvic XR: no remarkable findings, lower lumbar spine degeneration    Physical therapy rec [facility].     Pt is now medically optimized for discharge.   Incomplete 7/29/24  89-year-old male, AAOx3, ambulates with a cane intermittently, w/ PMHx of DM, prostate cancer, presenting to the ED after 2 falls at home and near the grocery store. Pt. denies dizziness, vertigo, lightheadedness, syncope, or LOC. Does endorse hitting his R forearm, where minor ecchymosis is noted on exam. However, ROM is fully intact.  Also noted with unsteady gait, Romberg sign +. Denies any neuropathy. Pt's daughter is planning to take pt to [place] for further workup of his prostate cancer. Admitted to medicine for ambulatory dysfunction s/p fall.    EKG  Sinus Rhythm with PACs, IRBBB, 63 bpm, QTc 421ms, IN 140ms, on my read no ST segment elevation or depression, no TWI    CTH: Small left frontal extra calvarial hematoma.  Pelvic XR: no remarkable findings, lower lumbar spine degeneration    Physical therapy rec [facility].     Pt is now medically optimized for discharge. CM following.  Discharge to home vs. [facility] per daughter who is supposed to arrive from [place] today (7/31).   89-year-old male, AAOx3, ambulates with a cane intermittently, w/ PMHx of DM, prostate cancer, presenting to the ED after 2 falls at home and near the grocery store. Pt. denies dizziness, vertigo, lightheadedness, syncope, or LOC. Does endorse hitting his R forearm, where minor ecchymosis is noted on exam. However, ROM is fully intact.  Also noted with unsteady gait, Romberg sign +. Denies any neuropathy. Pt's daughter is planning to take pt to [place] for further workup of his prostate cancer.  Patient is admitted to medicine for ambulatory dysfunction s/p fall.     EKG  Sinus Rhythm with PACs, IRBBB, 63 bpm, QTc 421ms, UT 140ms, on my read no ST segment elevation or depression, no TWI  CTH: Small left frontal extra calvarial hematoma.  Pelvic XR: no remarkable findings, lower lumbar spine degeneration  PT evaluated and recommends [facility]. CM followed for placement.   Pt with hyperglycemia, he denies  hx of diabetes. A1C 7.4. Started Metformin.   Pt also reported watery BM without N/V or abdominal pain. 1-2 episodes. Currently not on bowel regimen. Symptoms likely due to metformin use and Bicalumatide (for prostate ca).   Pt noted trace pedal edema likely dependant. Encouraged to elevate lower leg while in bed and out of bed to ambulate as tolerated.   Pt remains afebrile, no leukocytosis. VS stable.     Patient is medically optimized for discharge [facility]. Discharge plan discussed with attending.   Please refer to medical records for in depth hospital course as this is a brief summary.

## 2024-07-29 NOTE — PROGRESS NOTE ADULT - SUBJECTIVE AND OBJECTIVE BOX
NP Note discussed with  Primary Attending    Patient is a 89y old  Male who presents with a chief complaint of Ambulatory Dysfunction (28 Jul 2024 14:38)      INTERVAL HPI/OVERNIGHT EVENTS: no new complaints    MEDICATIONS  (STANDING):  atenolol  Tablet 25 milliGRAM(s) Oral daily  atorvastatin 20 milliGRAM(s) Oral at bedtime  bicalutamide 50 milliGRAM(s) Oral daily  dextrose 5%. 1000 milliLiter(s) (50 mL/Hr) IV Continuous <Continuous>  dorzolamide 2%/timolol 0.5% Ophthalmic Solution 1 Drop(s) Both EYES <User Schedule>  heparin   Injectable 5000 Unit(s) SubCutaneous every 12 hours  insulin lispro (ADMELOG) corrective regimen sliding scale   SubCutaneous at bedtime  insulin lispro (ADMELOG) corrective regimen sliding scale   SubCutaneous three times a day before meals  tamsulosin 0.4 milliGRAM(s) Oral at bedtime    MEDICATIONS  (PRN):  acetaminophen     Tablet .. 650 milliGRAM(s) Oral every 6 hours PRN Temp greater or equal to 38C (100.4F), Mild Pain (1 - 3)      __________________________________________________  REVIEW OF SYSTEMS:    CONSTITUTIONAL: No fever,   EYES: no acute visual disturbances  NECK: No pain or stiffness  RESPIRATORY: No cough; No shortness of breath  CARDIOVASCULAR: No chest pain, no palpitations  GASTROINTESTINAL: No pain. No nausea or vomiting; No diarrhea   NEUROLOGICAL: No headache or numbness, no tremors  MUSCULOSKELETAL: No joint pain, no muscle pain  GENITOURINARY: no dysuria, no frequency, no hesitancy  PSYCHIATRY: no depression , no anxiety  ALL OTHER  ROS negative        Vital Signs Last 24 Hrs  T(C): 31.9 (29 Jul 2024 05:20), Max: 37.3 (28 Jul 2024 13:59)  T(F): 89.4 (29 Jul 2024 05:20), Max: 99.1 (28 Jul 2024 13:59)  HR: 57 (29 Jul 2024 05:20) (57 - 65)  BP: 142/58 (29 Jul 2024 05:20) (137/82 - 142/58)  BP(mean): --  RR: 16 (29 Jul 2024 05:20) (16 - 17)  SpO2: 99% (29 Jul 2024 05:20) (98% - 100%)    Parameters below as of 29 Jul 2024 05:20  Patient On (Oxygen Delivery Method): room air        ________________________________________________  PHYSICAL EXAM:  GENERAL: NAD  HEENT: Normocephalic;  conjunctivae and sclerae clear; moist mucous membranes;   NECK : supple  CHEST/LUNG: Clear to auscultation bilaterally with good air entry   HEART: S1 S2  regular; no murmurs, gallops or rubs  ABDOMEN: Soft, Nontender, Nondistended; Bowel sounds present  EXTREMITIES: no cyanosis; no edema; no calf tenderness  SKIN: warm and dry; no rash  NERVOUS SYSTEM:  Awake and alert; Oriented  to place, person and time ; no new deficits    _________________________________________________  LABS:                        11.4   6.26  )-----------( 180      ( 29 Jul 2024 05:04 )             35.1     07-29    142  |  112<H>  |  27<H>  ----------------------------<  145<H>  4.0   |  25  |  1.13    Ca    8.4      29 Jul 2024 05:04  Mg     2.2     07-29    TPro  7.0  /  Alb  3.5  /  TBili  0.8  /  DBili  x   /  AST  27  /  ALT  25  /  AlkPhos  61  07-27      Urinalysis Basic - ( 29 Jul 2024 05:04 )    Color: x / Appearance: x / SG: x / pH: x  Gluc: 145 mg/dL / Ketone: x  / Bili: x / Urobili: x   Blood: x / Protein: x / Nitrite: x   Leuk Esterase: x / RBC: x / WBC x   Sq Epi: x / Non Sq Epi: x / Bacteria: x      CAPILLARY BLOOD GLUCOSE      POCT Blood Glucose.: 151 mg/dL (29 Jul 2024 08:10)  POCT Blood Glucose.: 156 mg/dL (28 Jul 2024 21:35)  POCT Blood Glucose.: 126 mg/dL (28 Jul 2024 16:36)  POCT Blood Glucose.: 234 mg/dL (28 Jul 2024 11:47)        RADIOLOGY & ADDITIONAL TESTS:    Imaging  Reviewed:  YES/NO    Consultant(s) Notes Reviewed:   YES/ No      Plan of care was discussed with patient and /or primary care giver; all questions and concerns were addressed  NP Note discussed with  Primary Attending    Patient is a 89y old  Male who presents with a chief complaint of Ambulatory Dysfunction (28 Jul 2024 14:38)    INTERVAL HPI/OVERNIGHT EVENTS: no new complaints    MEDICATIONS  (STANDING):  atenolol  Tablet 25 milliGRAM(s) Oral daily  atorvastatin 20 milliGRAM(s) Oral at bedtime  bicalutamide 50 milliGRAM(s) Oral daily  dextrose 5%. 1000 milliLiter(s) (50 mL/Hr) IV Continuous <Continuous>  dorzolamide 2%/timolol 0.5% Ophthalmic Solution 1 Drop(s) Both EYES <User Schedule>  heparin   Injectable 5000 Unit(s) SubCutaneous every 12 hours  insulin lispro (ADMELOG) corrective regimen sliding scale   SubCutaneous at bedtime  insulin lispro (ADMELOG) corrective regimen sliding scale   SubCutaneous three times a day before meals  tamsulosin 0.4 milliGRAM(s) Oral at bedtime    MEDICATIONS  (PRN):  acetaminophen     Tablet .. 650 milliGRAM(s) Oral every 6 hours PRN Temp greater or equal to 38C (100.4F), Mild Pain (1 - 3)      __________________________________________________  REVIEW OF SYSTEMS:    CONSTITUTIONAL: No fever,   EYES: no acute visual disturbances  NECK: No pain or stiffness  RESPIRATORY: No cough; No shortness of breath  CARDIOVASCULAR: No chest pain, no palpitations  GASTROINTESTINAL: No pain. No nausea or vomiting; No diarrhea   NEUROLOGICAL: No headache or numbness, no tremors  MUSCULOSKELETAL: No joint pain, no muscle pain  GENITOURINARY: no dysuria, no frequency, no hesitancy  PSYCHIATRY: no depression , no anxiety  ALL OTHER  ROS negative        Vital Signs Last 24 Hrs  T(C): 31.9 (29 Jul 2024 05:20), Max: 37.3 (28 Jul 2024 13:59)  T(F): 89.4 (29 Jul 2024 05:20), Max: 99.1 (28 Jul 2024 13:59)  HR: 57 (29 Jul 2024 05:20) (57 - 65)  BP: 142/58 (29 Jul 2024 05:20) (137/82 - 142/58)  BP(mean): --  RR: 16 (29 Jul 2024 05:20) (16 - 17)  SpO2: 99% (29 Jul 2024 05:20) (98% - 100%)    Parameters below as of 29 Jul 2024 05:20  Patient On (Oxygen Delivery Method): room air        ________________________________________________  PHYSICAL EXAM:  GENERAL: NAD  HEENT: Normocephalic;  conjunctivae and sclerae clear; moist mucous membranes;   NECK : supple  CHEST/LUNG: Clear to auscultation bilaterally with good air entry   HEART: S1 S2  regular; no murmurs, gallops or rubs  ABDOMEN: Soft, Nontender, Nondistended; Bowel sounds present  EXTREMITIES: no cyanosis; no edema; no calf tenderness  SKIN: warm and dry; no rash  NERVOUS SYSTEM:  Awake and alert; Oriented  to place, person and time ; no new deficits    _________________________________________________  LABS:                        11.4   6.26  )-----------( 180      ( 29 Jul 2024 05:04 )             35.1     07-29    142  |  112<H>  |  27<H>  ----------------------------<  145<H>  4.0   |  25  |  1.13    Ca    8.4      29 Jul 2024 05:04  Mg     2.2     07-29    TPro  7.0  /  Alb  3.5  /  TBili  0.8  /  DBili  x   /  AST  27  /  ALT  25  /  AlkPhos  61  07-27      Urinalysis Basic - ( 29 Jul 2024 05:04 )    Color: x / Appearance: x / SG: x / pH: x  Gluc: 145 mg/dL / Ketone: x  / Bili: x / Urobili: x   Blood: x / Protein: x / Nitrite: x   Leuk Esterase: x / RBC: x / WBC x   Sq Epi: x / Non Sq Epi: x / Bacteria: x      CAPILLARY BLOOD GLUCOSE      POCT Blood Glucose.: 151 mg/dL (29 Jul 2024 08:10)  POCT Blood Glucose.: 156 mg/dL (28 Jul 2024 21:35)  POCT Blood Glucose.: 126 mg/dL (28 Jul 2024 16:36)  POCT Blood Glucose.: 234 mg/dL (28 Jul 2024 11:47)        RADIOLOGY & ADDITIONAL TESTS:  < from: CT Head No Cont (07.27.24 @ 18:42) >    ACC: [id number] EXAM:  CT BRAIN   ORDERED BY: IVORY DOMINGUEZ     PROCEDURE DATE:  07/27/2024          INTERPRETATION:  CLINICAL INFORMATION: fall    COMPARISON: None.      TECHNIQUE:  Serial axial images were obtained from the skull base to the   vertex using multi-slice helical technique. Sagittal and coronal   reformats were obtained.    FINDINGS: Small left frontal extracalvarial hematoma. No fracture of the   underlying calvarium.    VENTRICLES AND SULCI: Age appropriate involutional changes.  INTRA-AXIAL: No mass effect, acute hemorrhage, or midline shift.  EXTRA-AXIAL: No mass or fluid collection. Basal cisterns are normal in   appearance. Deposition of calcified plaques in association with the   distal intracranial bilateral vertebral arteries and bilateral carotid   siphons.    VISUALIZED SINUSES:  Bilateral maxillary sinus and bilateral ethmoid air   cell mucosal thickening. No paranasal sinus air-fluid levels.  TYMPANOMASTOID CAVITIES:  Clear.  VISUALIZED ORBITS: Appearance of the bilateral optic lenses suggests the   patient has previously undergone prior cataract surgery.  CALVARIUM: Intact.    MISCELLANEOUS: None.      IMPRESSION:  Small left frontal extracalvarial hematoma. No acute intracranial   hemorrhage, mass effect, or midline shift. If patient demonstrates   persistent headaches or altered mental status, consider further   evaluation via MR imaging to include DWI and ADC mapping techniques,   along with gradient echo acquisition technique, provided there are no   contraindications.      --- End of Report ---        DAWN BEHR-VENTURA MD; Attending Radiologist  This document has been electronically signed. Jul 27 2024  6:54PM    < end of copied text >    Imaging  Reviewed:  YES    Consultant(s) Notes Reviewed:   YES    Plan of care was discussed with patient; all questions and concerns were addressed

## 2024-07-29 NOTE — DISCHARGE NOTE PROVIDER - NSDCMRMEDTOKEN_GEN_ALL_CORE_FT
atenolol 25 mg oral tablet: 1 tab(s) orally once a day  bicalutamide 50 mg oral tablet: 1 tab(s) orally once a day  dorzolamide-timolol 2.23%-0.68% (2%-0.5% base) ophthalmic solution: 1 drop(s) in each eye once a day  lisinopril 10 mg oral tablet: 1 tab(s) orally once a day  lovastatin 20 mg oral tablet: 1 tab(s) orally once a day   atenolol 25 mg oral tablet: 1 tab(s) orally once a day  bicalutamide 50 mg oral tablet: 1 tab(s) orally once a day  dorzolamide-timolol 2.23%-0.68% (2%-0.5% base) ophthalmic solution: 1 drop(s) in each eye 2 times a day  lisinopril 10 mg oral tablet: 1 tab(s) orally once a day  lovastatin 20 mg oral tablet: 1 tab(s) orally once a day   acetaminophen 325 mg oral tablet: 2 tab(s) orally every 6 hours As needed Temp greater or equal to 38C (100.4F), Mild Pain (1 - 3)  atenolol 25 mg oral tablet: 1 tab(s) orally once a day  bicalutamide 50 mg oral tablet: 1 tab(s) orally once a day  dorzolamide-timolol 2.23%-0.68% (2%-0.5% base) ophthalmic solution: 1 drop(s) in each eye 2 times a day  lactobacillus acidophilus oral capsule: 1 cap(s) orally 3 times a day with meals  lisinopril 10 mg oral tablet: 1 tab(s) orally once a day  lovastatin 20 mg oral tablet: 1 tab(s) orally once a day  metFORMIN 500 mg oral tablet: 1 tab(s) orally 2 times a day  tamsulosin 0.4 mg oral capsule: 1 cap(s) orally once a day (at bedtime)

## 2024-07-29 NOTE — DISCHARGE NOTE PROVIDER - PREFACE STATEMENT FOR MINUTES SPENT
Assessment and Plan:   Ms Genoveva Oliveira a 70-year-old female with history significant for irritable bowel syndrome with constipation, anxiety and depression who presents for re-evaluation of a positive ABEL, lupus anticoagulant and chilblains     - Brenda presents today for a follow-up of chilblains affecting her bilateral toes which we discussed managing as an independent condition for now but given the new findings of a positive ABEL and lupus anticoagulant as well as prior symptoms which have since improved including night sweats, dry mouth, recurrent painful mouth ulcerations and arthralgias I would like to continue monitoring her for a connective tissue disease  Follow-up blood work will be done at our office visits but it is reassuring to note that subjectively she is doing very well at this time and the chilblains also resolved with conservative measures such as keeping her extremities warm and with a 1 month course of nifedipine which can be used on an as-needed basis  - A lupus anticoagulant is pending at this time but if it is persistently elevated then I discussed with her that this may increase her risk for blood clots/miscarriages  There is insufficient literature to support initiation of prophylactic antiplatelet/antithrombotic agents so the plan will be for continued monitoring, although this may change depending on her pregnancy plans     - In regards to the joint pains she experiences she will continue managing with ibuprofen as needed  If the ankle symptoms persist or worsen she may be agreeable to considering a trial of physical therapy        Plan:  Diagnoses and all orders for this visit:    Chilblains, subsequent encounter    Positive ABEL (antinuclear antibody)  -     CBC and differential; Future  -     C-reactive protein; Future  -     Sedimentation rate, automated; Future  -     C4 complement; Future  -     C3 complement; Future  -     Anti-DNA antibody, double-stranded;  Future  - Urinalysis with microscopic  -     Protein / creatinine ratio, urine    Lupus anticoagulant positive    Diffuse arthralgia    Mouth ulcers      Activities as tolerated  Continue other medications as prescribed by PCP and other specialists  RTC in 6 months          HPI    INITIAL VISIT NOTE (12/2020):  Ms Martell Cuadra a 57-year-old female with history significant for irritable bowel syndrome with constipation, anxiety and depression, who presents for further evaluation of multiple complaints including recurrent mouth ulcerations and joint pains  Taiwo Kowalski is referred by MAYRA Chacon for a rheumatology consult      Patient reports she has experienced joint pains affecting her low back/hip region and bilateral knees probably since her childhood, but she has noticed them to be more prominent and progressive over the past 2-3 years  Taiwo Kowalski had seen orthopedics while she was in high school and had to utilize knee braces   She reports while the pain may be present on a low level intensity nearly every day, approximately once per week she will have a significant flare-up of the pain   She reports with resting she may get some relief but not every time   She does take over-the-counter ibuprofen as needed for the joint pains which helps   In terms of the low back pain this does not wake her up from sleep at night and is not associated with morning stiffness   She reports activities will generally worsen her pain and she does get some relief with resting   At times she may notice an achiness sensation in her hands   She denies any joint pains of her wrists, elbows, shoulders, ankles or feet   Intermittently she has noticed mild swelling affecting her knees   She experiences morning stiffness only affecting her knees which takes about 30-45 minutes to improve   She has had x-rays of her hips and knees done in 2018 and 2020 which were all normal      Over the past 6 months she has also noticed additional symptoms arise such as recurrent painful mouth ulcerations that appear like canker sores   They resolve spontaneously but she states over the past few months they have been present on a near constant basis   She has seen her primary care physician for this and had an HSV swab done which was normal   She has also been trialed on multiple medications including fluconazole, famciclovir, lidocaine mucosal solution and triamcinolone topical paste which have not helped significantly   She does not get any ulcers in her nose or in her genital region, but does describe frequent yeast infections        In the past 6 months she has also had issues with lower abdominal pain flare-ups associated with constipation   She was seen by Gastroenterology and had a CT abdomen as well as colonoscopy done which were all normal   She was diagnosed with irritable bowel syndrome with constipation   She has noticed issues relating to her toes being very sensitive with color changes to white, but this does not always happen with cold exposure and seems to occur spontaneously   She does not notice any color changes affecting her hands      She denies fevers, chills, unintentional weight loss (has noticed night sweats), hair loss, dry eyes (does notice dry mouth), inflammatory eye disease, skin rash, psoriasis, photosensitivity, swollen glands, pleuritic chest pain, shortness of breath, blood in stools, blood clots, miscarriages, symptoms strongly suggestive of Raynaud's or family history of autoimmune disease   She does have 2 siblings who are healthy   She did have appendicitis when she was in 2nd grade but otherwise had a healthy childhood      In view of the constellation of symptoms she was seen by her primary care physician and had testing done in September 2020 which showed a normal ABEL, CK, rheumatoid factor, Lyme antibody profile and ESR   The CRP was minimally elevated at 5   Prior to that a CBC, CMP, TSH and vitamin-D were normal   She did have testing done in October 2018 as well which showed a normal ABEL, rheumatoid factor and HLA B27 antigen   The ESR and CRP were minimally elevated at 25 and 6, respectively         3/31/2021:  Patient presents for a follow-up today  I reviewed her labs which showed a positive ABEL by immunofluorescence 1:160 dense fine speckled pattern  The ABEL specificity, C3, C4, antiphospholipid antibody testing, urinalysis, urine protein creatinine ratio, hepatitis panel, ferritin, anti CCP antibody, celiac disease antibody profile, HIV testing and herpes antibodies were unremarkable      Since the last office visit she has not had any new complaints and continues to experience the night sweats, lower abdominal discomfort, Raynaud's like symptoms and joint pains  She states that the mouth ulcers resolved spontaneously and she has not had any in the past 1 and half months  She did try the over-the-counter zinc but this was not helpful        5/25/2022:  Patient presents for a follow-up today for re-evaluation of a positive ABEL  She was last seen in March 2021 at which time there were no concerning lab abnormalities or diagnosis and I had recommended a monitoring approach  She was scheduled for April 2022 but canceled her appointment as she was feeling well as the symptoms we discussed previously such as dry mouth and night sweats resolved with lowering her antidepressant medications  She reports the mouth sores also resolved spontaneously and have occurred on a rare occasion  She mentions the knee pain she was experiencing had improved after physical therapy  The Raynaud's like symptoms affecting her feet were still occurring but were manageable    No symptoms such as fevers, unintentional weight loss, alopecia, dry eyes, skin rash, nose ulcers, swollen glands, pleuritic chest pain or additional concerning joint pains/swelling/stiffness (does notice this in her TMJs, wrists and ankles but thinks this may be related to rolling her joints which she does when she is anxious)  Her main concern at this time is the appearance of red, blistering lesions that are very itchy occurring mostly on her bilateral toes as well as small spots on the palmar aspect of her bilateral 3rd fingers  Initially she was seen by her primary care physician and there were concerns that this may be secondary to athlete's foot but she did not respond to topical or oral antifungals  She was then referred to Dermatology and diagnosed with chilblains  To assess if this may be secondary to lupus she had blood work done which showed a positive ABEL 1:80 homogeneous pattern with a positive lupus anticoagulant  A C-reactive protein was slightly elevated at 6 3  Cryoglobulin, cryofibrinogen, antiphospholipid antibody panel, double-stranded DNA antibody and Sjogren's antibodies were normal   She was asked to establish with Rheumatology for these abnormal labs  She was prescribed topical triamcinolone ointment but this has not helped significantly  She reports with the warmer temperatures her symptoms have improved but are still present  Of note she denies a history of thrombotic events or miscarriages  She also discussed with her OBGYN changing her oral contraceptive to an IUD       8/29/2022:  Patient presents for a follow-up of chilblains  I reviewed the workup done after the last office visit which showed a normal ABEL specificity, ESR, C3, C4, urinalysis, urine protein creatinine ratio, anti neutrophilic cytoplasmic antibodies and CK  She had a recent repeat lupus anticoagulant drawn which is still pending  After the last office visit we discussed managing for chilblains and monitoring for the possibility of systemic lupus erythematosus  I started her on nifedipine at 10 mg once daily which she took for a month and reports that this seemed to help with her foot symptoms    They seem to have resolved except for very slight discoloration noted on the skin of her big toes  No painful or itchy toes  She reports with the nifedipine she did have some lightheadedness and tremors but checked her blood pressures daily and they remained normal   She would like to take the nifedipine on an as-needed basis and continue with conservative measures such as keeping her extremities warm  Otherwise she reports pain and stiffness that can affect her bilateral ankles but she states if she rolls her ankles and they pop then the symptoms resolve  She takes ibuprofen as needed which also helps  No other complaints noted today  The following portions of the patient's history were reviewed and updated as appropriate: allergies, current medications, past family history, past medical history, past social history, past surgical history and problem list       Review of Systems  Constitutional: Negative for weight change, fevers, chills, night sweats, fatigue  ENT/Mouth: Negative for hearing changes, ear pain, nasal congestion, sinus pain, hoarseness, sore throat, rhinorrhea, swallowing difficulty  Eyes: Negative for pain, redness, discharge, vision changes  Cardiovascular: Negative for chest pain, SOB, palpitations  Respiratory: Negative for cough, sputum, wheezing, dyspnea  Gastrointestinal: Negative for nausea, vomiting, diarrhea, constipation, pain, heartburn  Genitourinary: Negative for dysuria, urinary frequency, hematuria  Musculoskeletal: As per HPI  Skin: Positive for skin rash, color changes  Neuro: Negative for weakness, numbness, tingling, loss of consciousness  Psych: Negative for anxiety, depression  Heme/Lymph: Negative for easy bruising, bleeding, lymphadenopathy          Past Medical History:   Diagnosis Date    Anxiety     Chronic vaginitis 3/15/2017    Concussion     Last Assessed: 11/15/2017    Depression        Past Surgical History:   Procedure Laterality Date    APPENDECTOMY      INSERTION OF INTRAUTERINE DEVICE (IUD) N/A        Social History     Socioeconomic History    Marital status: Single     Spouse name: Not on file    Number of children: Not on file    Years of education: Not on file    Highest education level: Not on file   Occupational History    Not on file   Tobacco Use    Smoking status: Never Smoker    Smokeless tobacco: Never Used    Tobacco comment: Vape   Vaping Use    Vaping Use: Former    Substances: Nicotine, Flavoring   Substance and Sexual Activity    Alcohol use: Yes     Comment: social    Drug use: No    Sexual activity: Yes     Partners: Male     Birth control/protection: OCP   Other Topics Concern    Not on file   Social History Narrative    Immunization status: up to date and documented       Social Determinants of Health     Financial Resource Strain: Not on file   Food Insecurity: Not on file   Transportation Needs: Not on file   Physical Activity: Not on file   Stress: Not on file   Social Connections: Not on file   Intimate Partner Violence: Not on file   Housing Stability: Not on file       Family History   Problem Relation Age of Onset    Restless legs syndrome Mother     Depression Mother     Depression Father     Colon cancer Paternal Grandmother     Uterine cancer Paternal Grandmother     OCD Sister     Anxiety disorder Brother     Hashimoto's thyroiditis Maternal Grandmother     Breast cancer Neg Hx     Ovarian cancer Neg Hx     Cervical cancer Neg Hx        No Known Allergies      Current Outpatient Medications:     albuterol (PROVENTIL HFA,VENTOLIN HFA) 90 mcg/act inhaler, Inhale 1-2 puffs, Disp: , Rfl: 0    dicyclomine (BENTYL) 20 mg tablet, Take 1 tablet (20 mg total) by mouth every 6 (six) hours, Disp: 60 tablet, Rfl: 0    venlafaxine (EFFEXOR-XR) 75 mg 24 hr capsule, Take 1 capsule (75 mg total) by mouth daily, Disp: 90 capsule, Rfl: 1    drospirenone-ethinyl estradiol (BERTRAM) 3-0 02 MG per tablet, Take 1 tablet by mouth daily (Patient not taking: Reported on 8/29/2022), Disp: , Rfl:     triamcinolone (KENALOG) 0 1 % cream, Apply topically twice a day as needed for itch (Patient not taking: Reported on 8/29/2022), Disp: 30 g, Rfl: 1      Objective:    Vitals:    08/29/22 1138   BP: 123/75   Pulse: 65   Resp: 19   Temp: 98 2 °F (36 8 °C)   Weight: 59 kg (130 lb)   Height: 5' 3" (1 6 m)       Physical Exam  General: Well appearing, well nourished, in no distress  Oriented x 3, normal mood and affect  Ambulating without difficulty  Skin: Good turgor, no unusual bruising or prominent lesions  Minimal erythematous discoloration noted on the skin of her bilateral toes  Hair: Normal texture and distribution  Nails: Normal color, no deformities  HEENT:  Head: Normocephalic, atraumatic  Eyes: Conjunctiva clear, sclera non-icteric, EOM intact  Nose: No external lesions, mucosa non-inflamed  Extremities: No amputations or deformities, cyanosis, edema  Neurologic: Alert and oriented  No focal neurological deficits appreciated  Psychiatric: Normal mood and affect  BALDEMAR Martin    Rheumatology I personally spent

## 2024-07-29 NOTE — DISCHARGE NOTE PROVIDER - ATTENDING DISCHARGE PHYSICAL EXAMINATION:
GENERAL: NAD  HEENT: Normocephalic;  conjunctivae and sclerae clear; moist mucous membranes;   NECK : supple  CHEST/LUNG: Clear to auscultation bilaterally with good air entry   HEART: S1 S2  regular; no murmurs, gallops or rubs  ABDOMEN: Soft, Nontender, Nondistended; Bowel sounds present  EXTREMITIES: no cyanosis; no edema; no calf tenderness  SKIN: warm and dry; no rash  NERVOUS SYSTEM:  Awake and alert; Oriented  to place, person and time ; no new deficits GENERAL: NAD  HEENT: Normocephalic;  conjunctivae and sclerae clear; moist mucous membranes;   NECK : supple  CHEST/LUNG: Clear to auscultation bilaterally with good air entry   HEART: S1 S2  regular; no murmurs, gallops or rubs  ABDOMEN: Soft, Nontender, Nondistended; Bowel sounds present  EXTREMITIES: no cyanosis; 1+ LE pitting edema; no calf tenderness  SKIN: warm and dry; no rash  NERVOUS SYSTEM:  Awake and alert; Oriented  to place, person and time ; no new deficits

## 2024-07-29 NOTE — DISCHARGE NOTE PROVIDER - PROVIDER TOKENS
PROVIDER:[TOKEN:[[id number]],FOLLOWUP:[1 week]] PROVIDER:[TOKEN:[[id number]],FOLLOWUP:[1 week]],FREE:[LAST:[[facility]PHONE:[(   )    -],FAX:[(   )    -],FOLLOWUP:[1-3 days]]

## 2024-07-30 LAB
A1C WITH ESTIMATED AVERAGE GLUCOSE RESULT: 7.4 % — HIGH (ref 4–5.6)
ESTIMATED AVERAGE GLUCOSE: 166 MG/DL — HIGH (ref 68–114)
GLUCOSE BLDC GLUCOMTR-MCNC: 125 MG/DL — HIGH (ref 70–99)
GLUCOSE BLDC GLUCOMTR-MCNC: 136 MG/DL — HIGH (ref 70–99)
GLUCOSE BLDC GLUCOMTR-MCNC: 226 MG/DL — HIGH (ref 70–99)
GLUCOSE BLDC GLUCOMTR-MCNC: 340 MG/DL — HIGH (ref 70–99)

## 2024-07-30 PROCEDURE — 99239 HOSP IP/OBS DSCHRG MGMT >30: CPT

## 2024-07-30 RX ADMIN — DORZOLAMIDE HYDROCHLORIDE TIMOLOL MALEATE 1 DROP(S): 20; 5 SOLUTION/ DROPS OPHTHALMIC at 09:59

## 2024-07-30 RX ADMIN — ATORVASTATIN CALCIUM 20 MILLIGRAM(S): 40 TABLET, FILM COATED ORAL at 22:52

## 2024-07-30 RX ADMIN — Medication 4: at 11:45

## 2024-07-30 RX ADMIN — HEPARIN SODIUM 5000 UNIT(S): 1000 INJECTION, SOLUTION INTRAVENOUS; SUBCUTANEOUS at 05:51

## 2024-07-30 RX ADMIN — Medication 0.4 MILLIGRAM(S): at 22:52

## 2024-07-30 RX ADMIN — BICALUTAMIDE 50 MILLIGRAM(S): 50 TABLET, FILM COATED ORAL at 13:00

## 2024-07-30 RX ADMIN — HEPARIN SODIUM 5000 UNIT(S): 1000 INJECTION, SOLUTION INTRAVENOUS; SUBCUTANEOUS at 18:51

## 2024-07-30 RX ADMIN — Medication 500 MILLIGRAM(S): at 18:52

## 2024-07-30 NOTE — PROGRESS NOTE ADULT - PROBLEM SELECTOR PLAN 1
s/p fall x2  CTH: Small left frontal extracalvarial hematoma. if patient demonstrates persistent headaches or altered mental status, consider further evaluation  Pelvic XR: no remarkable findings, lower lumbar spine degeneration  on exam, Romberg sign +, unsteady gait  PT: EDUARD ,

## 2024-07-30 NOTE — PROGRESS NOTE ADULT - NS ATTEND AMEND GEN_ALL_CORE FT
89 year old male patient with pmhx DM, prostate cancer, HTN, HLD who presented to the ER after falling twice.The patient was at home and bent down to  a coin and fell forward. He later went to the store and coming home he was bending down with his grocery bags and again fell. He was found by someone who helped him back home by giving him a ride. His neighbor is a nurse and told him to go to the ER since he fell twice. The patient denies any lightheadedness, dizziness, orthostatic lightheadedness, palpitations, or any unusual signs before falling. He states he has imbalance chronically.    # Falls  # Ambulatory Dysfunction  # Uric acid crystals on U/A  # Reactive Leukocytosis 2/2 Fall  # Hx of DM  # Hx of Prostate Cancer, HLD, HTN    Plan:  PT evl appreciated; EDUARD recommended - daughter and patient are both agreeable for EDUARD  Case mgmt/ SW follow up for discharge disposition - pending acceptance  U/A with uric acid crystals unclear if needs any Rx as asymptomatic  Insulin Sliding Scale; Accuchek ACHS; - Diabetic DASH Diet  Continue home medications Bicalutamide, Statin, Lisinopril  DVT PPx:  Heparin SQ  Monitor and replete electrolytes as needed  Aspiration/ Fall  Precautions

## 2024-07-30 NOTE — PROGRESS NOTE ADULT - SUBJECTIVE AND OBJECTIVE BOX
NP Note discussed with  Primary Attending    Patient is a 89y old  Male who presents with a chief complaint of Ambulatory Dysfunction (29 Jul 2024 11:40)      INTERVAL HPI/OVERNIGHT EVENTS: no new complaints    MEDICATIONS  (STANDING):  atenolol  Tablet 25 milliGRAM(s) Oral daily  atorvastatin 20 milliGRAM(s) Oral at bedtime  bicalutamide 50 milliGRAM(s) Oral daily  dextrose 5%. 1000 milliLiter(s) (50 mL/Hr) IV Continuous <Continuous>  dorzolamide 2%/timolol 0.5% Ophthalmic Solution 1 Drop(s) Both EYES <User Schedule>  heparin   Injectable 5000 Unit(s) SubCutaneous every 12 hours  insulin lispro (ADMELOG) corrective regimen sliding scale   SubCutaneous at bedtime  insulin lispro (ADMELOG) corrective regimen sliding scale   SubCutaneous three times a day before meals  metFORMIN 500 milliGRAM(s) Oral two times a day  tamsulosin 0.4 milliGRAM(s) Oral at bedtime    MEDICATIONS  (PRN):  acetaminophen     Tablet .. 650 milliGRAM(s) Oral every 6 hours PRN Temp greater or equal to 38C (100.4F), Mild Pain (1 - 3)      __________________________________________________  REVIEW OF SYSTEMS:    CONSTITUTIONAL: No fever,   EYES: no acute visual disturbances  NECK: No pain or stiffness  RESPIRATORY: No cough; No shortness of breath  CARDIOVASCULAR: No chest pain, no palpitations  GASTROINTESTINAL: No pain. No nausea or vomiting; No diarrhea   NEUROLOGICAL: No headache or numbness, no tremors  MUSCULOSKELETAL: No joint pain, no muscle pain  GENITOURINARY: no dysuria, no frequency, no hesitancy  PSYCHIATRY: no depression , no anxiety  ALL OTHER  ROS negative        Vital Signs Last 24 Hrs  T(C): 36.5 (30 Jul 2024 13:13), Max: 37.2 (30 Jul 2024 05:07)  T(F): 97.7 (30 Jul 2024 13:13), Max: 99 (30 Jul 2024 05:07)  HR: 55 (30 Jul 2024 13:13) (48 - 55)  BP: 164/67 (30 Jul 2024 13:13) (135/70 - 164/67)  BP(mean): 92 (30 Jul 2024 13:13) (91 - 92)  RR: 17 (30 Jul 2024 13:13) (16 - 17)  SpO2: 99% (30 Jul 2024 13:13) (97% - 99%)    Parameters below as of 30 Jul 2024 13:13  Patient On (Oxygen Delivery Method): room air        ________________________________________________  PHYSICAL EXAM:  GENERAL: NAD  HEENT: Normocephalic;  conjunctivae and sclerae clear; moist mucous membranes;   NECK : supple  CHEST/LUNG: Clear to auscultation bilaterally with good air entry   HEART: S1 S2  regular; no murmurs, gallops or rubs  ABDOMEN: Soft, Nontender, Nondistended; Bowel sounds present  EXTREMITIES: no cyanosis; no edema; no calf tenderness  SKIN: warm and dry; no rash  NERVOUS SYSTEM:  Awake and alert; Oriented  to place, person and time ; no new deficits    _________________________________________________  LABS:                        11.4   6.26  )-----------( 180      ( 29 Jul 2024 05:04 )             35.1     07-29    142  |  112<H>  |  27<H>  ----------------------------<  145<H>  4.0   |  25  |  1.13    Ca    8.4      29 Jul 2024 05:04  Mg     2.2     07-29        Urinalysis Basic - ( 29 Jul 2024 05:04 )    Color: x / Appearance: x / SG: x / pH: x  Gluc: 145 mg/dL / Ketone: x  / Bili: x / Urobili: x   Blood: x / Protein: x / Nitrite: x   Leuk Esterase: x / RBC: x / WBC x   Sq Epi: x / Non Sq Epi: x / Bacteria: x      CAPILLARY BLOOD GLUCOSE      POCT Blood Glucose.: 340 mg/dL (30 Jul 2024 11:37)  POCT Blood Glucose.: 136 mg/dL (30 Jul 2024 08:07)  POCT Blood Glucose.: 178 mg/dL (29 Jul 2024 22:56)  POCT Blood Glucose.: 139 mg/dL (29 Jul 2024 16:57)        RADIOLOGY & ADDITIONAL TESTS:  < from: CT Head No Cont (07.27.24 @ 18:42) >    ACC: [id number] EXAM:  CT BRAIN   ORDERED BY: IVORY DOMINGUEZ     PROCEDURE DATE:  07/27/2024          INTERPRETATION:  CLINICAL INFORMATION: fall    COMPARISON: None.      TECHNIQUE:  Serial axial images were obtained from the skull base to the   vertex using multi-slice helical technique. Sagittal and coronal   reformats were obtained.    FINDINGS: Small left frontal extracalvarial hematoma. No fracture of the   underlying calvarium.    VENTRICLES AND SULCI: Age appropriate involutional changes.  INTRA-AXIAL: No mass effect, acute hemorrhage, or midline shift.  EXTRA-AXIAL: No mass or fluid collection. Basal cisterns are normal in   appearance. Deposition of calcified plaques in association with the   distal intracranial bilateral vertebral arteries and bilateral carotid   siphons.    VISUALIZED SINUSES:  Bilateral maxillary sinus and bilateral ethmoid air   cell mucosal thickening. No paranasal sinus air-fluid levels.  TYMPANOMASTOID CAVITIES:  Clear.  VISUALIZED ORBITS: Appearance of the bilateral optic lenses suggests the   patient has previously undergone prior cataract surgery.  CALVARIUM: Intact.    MISCELLANEOUS: None.      IMPRESSION:  Small left frontal extracalvarial hematoma. No acute intracranial   hemorrhage, mass effect, or midline shift. If patient demonstrates   persistent headaches or altered mental status, consider further   evaluation via MR imaging to include DWI and ADC mapping techniques,   along with gradient echo acquisition technique, provided there are no   contraindications.      --- End of Report ---      DAWN BEHR-VENTURA MD; Attending Radiologist  This document has been electronically signed. Jul 27 2024  6:54PM    < end of copied text >    Imaging  Reviewed:  YES    Consultant(s) Notes Reviewed:   YES    Plan of care was discussed with patient: all questions and concerns were addressed

## 2024-07-31 LAB
GLUCOSE BLDC GLUCOMTR-MCNC: 132 MG/DL — HIGH (ref 70–99)
GLUCOSE BLDC GLUCOMTR-MCNC: 160 MG/DL — HIGH (ref 70–99)
GLUCOSE BLDC GLUCOMTR-MCNC: 199 MG/DL — HIGH (ref 70–99)
GLUCOSE BLDC GLUCOMTR-MCNC: 227 MG/DL — HIGH (ref 70–99)

## 2024-07-31 PROCEDURE — 99232 SBSQ HOSP IP/OBS MODERATE 35: CPT

## 2024-07-31 RX ADMIN — Medication 1: at 08:32

## 2024-07-31 RX ADMIN — Medication 1: at 11:55

## 2024-07-31 RX ADMIN — DORZOLAMIDE HYDROCHLORIDE TIMOLOL MALEATE 1 DROP(S): 20; 5 SOLUTION/ DROPS OPHTHALMIC at 08:32

## 2024-07-31 RX ADMIN — BICALUTAMIDE 50 MILLIGRAM(S): 50 TABLET, FILM COATED ORAL at 11:56

## 2024-07-31 RX ADMIN — HEPARIN SODIUM 5000 UNIT(S): 1000 INJECTION, SOLUTION INTRAVENOUS; SUBCUTANEOUS at 17:13

## 2024-07-31 RX ADMIN — ATORVASTATIN CALCIUM 20 MILLIGRAM(S): 40 TABLET, FILM COATED ORAL at 21:20

## 2024-07-31 RX ADMIN — Medication 500 MILLIGRAM(S): at 06:18

## 2024-07-31 RX ADMIN — Medication 0.4 MILLIGRAM(S): at 21:20

## 2024-07-31 RX ADMIN — HEPARIN SODIUM 5000 UNIT(S): 1000 INJECTION, SOLUTION INTRAVENOUS; SUBCUTANEOUS at 06:18

## 2024-07-31 RX ADMIN — Medication 500 MILLIGRAM(S): at 17:12

## 2024-07-31 NOTE — PROGRESS NOTE ADULT - SUBJECTIVE AND OBJECTIVE BOX
NP Note discussed with  Primary Attending    INTERVAL HPI/OVERNIGHT EVENTS: no new complaints, Pt is pleasant, cooperative on exam.     MEDICATIONS  (STANDING):  atenolol  Tablet 25 milliGRAM(s) Oral daily  atorvastatin 20 milliGRAM(s) Oral at bedtime  bicalutamide 50 milliGRAM(s) Oral daily  dextrose 5%. 1000 milliLiter(s) (50 mL/Hr) IV Continuous <Continuous>  dorzolamide 2%/timolol 0.5% Ophthalmic Solution 1 Drop(s) Both EYES <User Schedule>  heparin   Injectable 5000 Unit(s) SubCutaneous every 12 hours  insulin lispro (ADMELOG) corrective regimen sliding scale   SubCutaneous at bedtime  insulin lispro (ADMELOG) corrective regimen sliding scale   SubCutaneous three times a day before meals  metFORMIN 500 milliGRAM(s) Oral two times a day  tamsulosin 0.4 milliGRAM(s) Oral at bedtime    MEDICATIONS  (PRN):  acetaminophen     Tablet .. 650 milliGRAM(s) Oral every 6 hours PRN Temp greater or equal to 38C (100.4F), Mild Pain (1 - 3)      __________________________________________________  REVIEW OF SYSTEMS:    CONSTITUTIONAL: No fever,   EYES: no acute visual disturbances  NECK: No pain or stiffness  RESPIRATORY: No cough; No shortness of breath  CARDIOVASCULAR: No chest pain, no palpitations  GASTROINTESTINAL: No pain. No nausea or vomiting; No diarrhea   NEUROLOGICAL: No headache or numbness, no tremors  MUSCULOSKELETAL: No joint pain, no muscle pain  GENITOURINARY: no dysuria, no frequency, no hesitancy  PSYCHIATRY: no depression , no anxiety  ALL OTHER  ROS negative        Vital Signs Last 24 Hrs  T(C): 37 (31 Jul 2024 05:32), Max: 37 (30 Jul 2024 21:04)  T(F): 98.6 (31 Jul 2024 05:32), Max: 98.6 (30 Jul 2024 21:04)  HR: 55 (31 Jul 2024 05:32) (55 - 63)  BP: 146/58 (31 Jul 2024 05:32) (143/62 - 184/91)  BP(mean): 82 (31 Jul 2024 05:32) (82 - 92)  RR: 17 (31 Jul 2024 05:32) (17 - 18)  SpO2: 98% (31 Jul 2024 05:32) (98% - 100%)    Parameters below as of 31 Jul 2024 05:32  Patient On (Oxygen Delivery Method): room air        ________________________________________________  PHYSICAL EXAM:  GENERAL: NAD  HEENT: Normocephalic;  conjunctivae and sclerae clear; moist mucous membranes;   NECK : supple  CHEST/LUNG: Clear to auscultation bilaterally with good air entry   HEART: S1 S2  regular; no murmurs, gallops or rubs  ABDOMEN: Soft, Nontender, Nondistended; Bowel sounds present  EXTREMITIES: no cyanosis; no edema; no calf tenderness  SKIN: warm and dry; no rash  NERVOUS SYSTEM:  Awake and alert; Oriented  to place, person and time ; no new deficits    _________________________________________________  LABS:  CAPILLARY BLOOD GLUCOSE    POCT Blood Glucose.: 160 mg/dL (31 Jul 2024 08:11)  POCT Blood Glucose.: 226 mg/dL (30 Jul 2024 21:29)  POCT Blood Glucose.: 125 mg/dL (30 Jul 2024 16:48)  POCT Blood Glucose.: 340 mg/dL (30 Jul 2024 11:37)    RADIOLOGY & ADDITIONAL TESTS:    Imaging  Reviewed:  YES  < from: CT Head No Cont (07.27.24 @ 18:42) >    ACC: [id number] EXAM:  CT BRAIN   ORDERED BY: IVORY DOMINGUEZ     PROCEDURE DATE:  07/27/2024          INTERPRETATION:  CLINICAL INFORMATION: fall    COMPARISON: None.      TECHNIQUE:  Serial axial images were obtained from the skull base to the   vertex using multi-slice helical technique. Sagittal and coronal   reformats were obtained.    FINDINGS: Small left frontal extracalvarial hematoma. No fracture of the   underlying calvarium.    VENTRICLES AND SULCI: Age appropriate involutional changes.  INTRA-AXIAL: No mass effect, acute hemorrhage, or midline shift.  EXTRA-AXIAL: No mass or fluid collection. Basal cisterns are normal in   appearance. Deposition of calcified plaques in association with the   distal intracranial bilateral vertebral arteries and bilateral carotid   siphons.    VISUALIZED SINUSES:  Bilateral maxillary sinus and bilateral ethmoid air   cell mucosal thickening. No paranasal sinus air-fluid levels.  TYMPANOMASTOID CAVITIES:  Clear.  VISUALIZED ORBITS: Appearance of the bilateral optic lenses suggests the   patient has previously undergone prior cataract surgery.  CALVARIUM: Intact.    MISCELLANEOUS: None.      IMPRESSION:  Small left frontal extracalvarial hematoma. No acute intracranial   hemorrhage, mass effect, or midline shift. If patient demonstrates   persistent headaches or altered mental status, consider further   evaluation via MR imaging to include DWI and ADC mapping techniques,   along with gradient echo acquisition technique, provided there are no   contraindications.        --- End of Report ---            DAWN BEHR-VENTURA MD; Attending Radiologist  This document has been electronically signed. Jul 27 2024  6:54PM    < end of copied text >      < from: Xray Pelvis AP only (07.27.24 @ 15:41) >    ACC: [id number] EXAM:  XR PELVIS AP ONLY 1-2 VIEWS   ORDERED BY: IVORY DOMINGUEZ     PROCEDURE DATE:  07/27/2024          INTERPRETATION:  Pelvis. Patient had a fall.    There is degenerative loss of disc height in the lower lumbar levels.   There is mild symmetric hip degeneration. No fracture.    There is a 1 cm density involving the right iliac wing likely a bone   island.    IMPRESSION: No acute finding. Probable bone island in the right iliac   bone. Degeneration particularly in the lower lumbar spine.    --- End of Report ---            CÉSAR FREEMAN MD; Attending Radiologist  This document has been electronically signed. Jul 27 2024  5:45PM    < end of copied text >  Consultant(s) Notes Reviewed:   YES      Plan of care was discussed with patient and /or primary care giver; all questions and concerns were addressed

## 2024-07-31 NOTE — PROGRESS NOTE ADULT - NS ATTEND AMEND GEN_ALL_CORE FT
89 year old male patient with pmhx DM, prostate cancer, HTN, HLD who presented to the ER after falling twice.The patient was at home and bent down to  a coin and fell forward. He later went to the store and coming home he was bending down with his grocery bags and again fell. He was found by someone who helped him back home by giving him a ride. His neighbor is a nurse and told him to go to the ER since he fell twice. The patient denies any lightheadedness, dizziness, orthostatic lightheadedness, palpitations, or any unusual signs before falling. He states he has imbalance chronically.    # Falls  # Ambulatory Dysfunction  # Uric acid crystals on U/A  # Reactive Leukocytosis 2/2 Fall  # Hx of DM  # Hx of Prostate Cancer, HLD, HTN    Plan:  PT evl appreciated; EDUARD recommended - daughter and patient are both agreeable for EDUARD  Case mgmt/ SW follow up for discharge disposition - pending auth  U/A with uric acid crystals unclear if needs any Rx as asymptomatic  Insulin Sliding Scale; Accuchek ACHS; - Diabetic DASH Diet  Continue home medications Bicalutamide, Statin, Lisinopril  DVT PPx:  Heparin SQ  Monitor and replete electrolytes as needed  Aspiration/ Fall  Precautions

## 2024-08-01 VITALS
TEMPERATURE: 98 F | OXYGEN SATURATION: 98 % | SYSTOLIC BLOOD PRESSURE: 135 MMHG | RESPIRATION RATE: 17 BRPM | HEART RATE: 58 BPM | DIASTOLIC BLOOD PRESSURE: 62 MMHG

## 2024-08-01 DIAGNOSIS — R19.5 OTHER FECAL ABNORMALITIES: ICD-10-CM

## 2024-08-01 DIAGNOSIS — R60.0 LOCALIZED EDEMA: ICD-10-CM

## 2024-08-01 LAB
GLUCOSE BLDC GLUCOMTR-MCNC: 151 MG/DL — HIGH (ref 70–99)
GLUCOSE BLDC GLUCOMTR-MCNC: 196 MG/DL — HIGH (ref 70–99)
GLUCOSE BLDC GLUCOMTR-MCNC: 204 MG/DL — HIGH (ref 70–99)
GLUCOSE BLDC GLUCOMTR-MCNC: 294 MG/DL — HIGH (ref 70–99)

## 2024-08-01 PROCEDURE — 80048 BASIC METABOLIC PNL TOTAL CA: CPT

## 2024-08-01 PROCEDURE — 72170 X-RAY EXAM OF PELVIS: CPT

## 2024-08-01 PROCEDURE — 99285 EMERGENCY DEPT VISIT HI MDM: CPT | Mod: 25

## 2024-08-01 PROCEDURE — 84484 ASSAY OF TROPONIN QUANT: CPT

## 2024-08-01 PROCEDURE — 82962 GLUCOSE BLOOD TEST: CPT

## 2024-08-01 PROCEDURE — 85025 COMPLETE CBC W/AUTO DIFF WBC: CPT

## 2024-08-01 PROCEDURE — 80053 COMPREHEN METABOLIC PANEL: CPT

## 2024-08-01 PROCEDURE — 83036 HEMOGLOBIN GLYCOSYLATED A1C: CPT

## 2024-08-01 PROCEDURE — 83735 ASSAY OF MAGNESIUM: CPT

## 2024-08-01 PROCEDURE — 85027 COMPLETE CBC AUTOMATED: CPT

## 2024-08-01 PROCEDURE — 97110 THERAPEUTIC EXERCISES: CPT

## 2024-08-01 PROCEDURE — 70450 CT HEAD/BRAIN W/O DYE: CPT | Mod: MC

## 2024-08-01 PROCEDURE — 36415 COLL VENOUS BLD VENIPUNCTURE: CPT

## 2024-08-01 PROCEDURE — 97116 GAIT TRAINING THERAPY: CPT

## 2024-08-01 PROCEDURE — 99233 SBSQ HOSP IP/OBS HIGH 50: CPT

## 2024-08-01 PROCEDURE — 93005 ELECTROCARDIOGRAM TRACING: CPT

## 2024-08-01 PROCEDURE — 97530 THERAPEUTIC ACTIVITIES: CPT

## 2024-08-01 PROCEDURE — 81001 URINALYSIS AUTO W/SCOPE: CPT

## 2024-08-01 RX ORDER — TAMSULOSIN HCL 0.4 MG
1 CAPSULE ORAL
Qty: 0 | Refills: 0 | DISCHARGE
Start: 2024-08-01

## 2024-08-01 RX ORDER — ACETAMINOPHEN 500 MG
2 TABLET ORAL
Qty: 0 | Refills: 0 | DISCHARGE
Start: 2024-08-01

## 2024-08-01 RX ORDER — PROBIOTIC PRODUCT - TAB 1B-250 MG
1 TAB ORAL
Refills: 0 | Status: DISCONTINUED | OUTPATIENT
Start: 2024-08-01 | End: 2024-08-01

## 2024-08-01 RX ORDER — PROBIOTIC PRODUCT - TAB 1B-250 MG
1 TAB ORAL
Qty: 0 | Refills: 0 | DISCHARGE
Start: 2024-08-01

## 2024-08-01 RX ADMIN — Medication 0.4 MILLIGRAM(S): at 21:02

## 2024-08-01 RX ADMIN — DORZOLAMIDE HYDROCHLORIDE TIMOLOL MALEATE 1 DROP(S): 20; 5 SOLUTION/ DROPS OPHTHALMIC at 08:30

## 2024-08-01 RX ADMIN — Medication 1: at 08:28

## 2024-08-01 RX ADMIN — ATENOLOL 25 MILLIGRAM(S): 100 TABLET ORAL at 06:10

## 2024-08-01 RX ADMIN — Medication 500 MILLIGRAM(S): at 17:45

## 2024-08-01 RX ADMIN — PROBIOTIC PRODUCT - TAB 1 TABLET(S): TAB at 13:52

## 2024-08-01 RX ADMIN — BICALUTAMIDE 50 MILLIGRAM(S): 50 TABLET, FILM COATED ORAL at 12:11

## 2024-08-01 RX ADMIN — HEPARIN SODIUM 5000 UNIT(S): 1000 INJECTION, SOLUTION INTRAVENOUS; SUBCUTANEOUS at 17:45

## 2024-08-01 RX ADMIN — HEPARIN SODIUM 5000 UNIT(S): 1000 INJECTION, SOLUTION INTRAVENOUS; SUBCUTANEOUS at 06:10

## 2024-08-01 RX ADMIN — Medication 3: at 12:12

## 2024-08-01 RX ADMIN — ATORVASTATIN CALCIUM 20 MILLIGRAM(S): 40 TABLET, FILM COATED ORAL at 21:02

## 2024-08-01 RX ADMIN — Medication 500 MILLIGRAM(S): at 06:10

## 2024-08-01 RX ADMIN — Medication 2: at 17:45

## 2024-08-01 NOTE — PROGRESS NOTE ADULT - REASON FOR ADMISSION
Ambulatory Dysfunction

## 2024-08-01 NOTE — PROGRESS NOTE ADULT - PROBLEM SELECTOR PLAN 6
DVT ppx Heparin  fall precautions
c/w home med Atenolol  hold Lisinopril iso normal BP
DVT ppx Heparin  fall precautions
DVT ppx Heparin  fall precautions

## 2024-08-01 NOTE — PROGRESS NOTE ADULT - PROBLEM SELECTOR PLAN 5
c/w home med statin
patient denies hx of DM,   c/w ISS with meals   f/u A1C
patient denies hx of DM,   c/w ISS with meals   f/u A1C 7.4  will start metformin 500bid
patient denies hx of DM,   c/w ISS with meals   A1C 7.4  start metformin 500bid

## 2024-08-01 NOTE — PROGRESS NOTE ADULT - NS ATTEND AMEND GEN_ALL_CORE FT
89 year old male patient with pmhx DM, prostate cancer, HTN, HLD who presented to the ER after falling twice.The patient was at home and bent down to  a coin and fell forward. He later went to the store and coming home he was bending down with his grocery bags and again fell. He was found by someone who helped him back home by giving him a ride. His neighbor is a nurse and told him to go to the ER since he fell twice. The patient denies any lightheadedness, dizziness, orthostatic lightheadedness, palpitations, or any unusual signs before falling. He states he has imbalance chronically.    # Falls  #bilateral LE edema  # Ambulatory Dysfunction  # Uric acid crystals on U/A  # Reactive Leukocytosis 2/2 Fall  # DM  # Hx of Prostate Cancer, HLD, HTN  Patient with loose stool today - likely in setting of constipation and no BM for 3 days along with starting metformin for diabetes   - obtain GI PCR and stool cultures   - Monitor Stool count  - Continue metformin for A1c 7.4  - LE edema bilaterally - possibly dependent edema due recent immobility   - encourage ambulation and LLE elevation   - Continue to monitor   - also possible drug effect of recently started Bicalutamide - has been taking outpatient for past 1.5 months for elevated PSA  PT evl appreciated; EDUARD recommended - daughter and patient are both agreeable for EDUARD  Case mgmt/ SW follow up for discharge disposition - pending auth  U/A with uric acid crystals unclear if needs any Rx as asymptomatic  Insulin Sliding Scale; Accuchek ACHS; - Diabetic DASH Diet  Continue home medications Bicalutamide, Statin, Lisinopril  DVT PPx:  Heparin SQ  Monitor and replete electrolytes as needed  Aspiration/ Fall  Precautions

## 2024-08-01 NOTE — PROGRESS NOTE ADULT - PROBLEM SELECTOR PLAN 3
c/w home med statin
trace pedal edema b/l   new since admission  likely dependant edema  -encourage to elevate extremities while in bed.   -continue to monitor

## 2024-08-01 NOTE — PROGRESS NOTE ADULT - PROBLEM SELECTOR PLAN 9
-pt lives alone with 3-4 steps in home  -pt reccs EDUARD,  pending auth, CM following  -monitor for diarrhea, stool studies

## 2024-08-01 NOTE — PROGRESS NOTE ADULT - PROBLEM SELECTOR PLAN 4
c/w home med Atenolol  hold Lisinopril iso normal BP
c/w home med Bicalumatide 50 mg qd and Flomax

## 2024-08-01 NOTE — PROGRESS NOTE ADULT - PROBLEM SELECTOR PLAN 1
8/1 pt reports watery BM today. no abd pain, N/V  also reports lactose intolerance  Monitor stool count  GI pcr, stool culture, Ova/parasites.   -start probiotic.   -lactose free diet. 8/1 pt reports watery BM today. no abd pain, N/V  also reports lactose intolerance  takes metformin (new) and Bicalumatide  Monitor stool count  GI pcr, stool culture, Ova/parasites.   -start probiotic.   -lactose free diet. 8/1 pt reports watery BM today. no abd pain, N/V  also reports lactose intolerance  takes metformin (new) and Bicalumatide  Monitor stool count, diarrhea  GI pcr, stool culture, Ova/parasites.   -start probiotic.   -lactose free diet.

## 2024-08-01 NOTE — DISCHARGE NOTE NURSING/CASE MANAGEMENT/SOCIAL WORK - PATIENT PORTAL LINK FT
You can access the FollowMyHealth Patient Portal offered by [facility] by registering at the following website: [email or web address]. By joining [facility]’s FollowMyHealth portal, you will also be able to view your health information using other applications (apps) compatible with our system.

## 2024-08-01 NOTE — PROGRESS NOTE ADULT - SUBJECTIVE AND OBJECTIVE BOX
NP Note discussed with  Primary Attending    INTERVAL HPI/OVERNIGHT EVENTS: seen at bedside, pt states he had large water BM this morning x 1 episode no abdominal pain, N/V. He usually has regular BM, but constipated while in the hospital. Not on bowel regiment. Afebrile,      MEDICATIONS  (STANDING):  atenolol  Tablet 25 milliGRAM(s) Oral daily  atorvastatin 20 milliGRAM(s) Oral at bedtime  bicalutamide 50 milliGRAM(s) Oral daily  dextrose 5%. 1000 milliLiter(s) (50 mL/Hr) IV Continuous <Continuous>  dorzolamide 2%/timolol 0.5% Ophthalmic Solution 1 Drop(s) Both EYES <User Schedule>  heparin   Injectable 5000 Unit(s) SubCutaneous every 12 hours  insulin lispro (ADMELOG) corrective regimen sliding scale   SubCutaneous at bedtime  insulin lispro (ADMELOG) corrective regimen sliding scale   SubCutaneous three times a day before meals  metFORMIN 500 milliGRAM(s) Oral two times a day  tamsulosin 0.4 milliGRAM(s) Oral at bedtime    MEDICATIONS  (PRN):  acetaminophen     Tablet .. 650 milliGRAM(s) Oral every 6 hours PRN Temp greater or equal to 38C (100.4F), Mild Pain (1 - 3)      __________________________________________________  REVIEW OF SYSTEMS:    CONSTITUTIONAL: No fever,   EYES: no acute visual disturbances  NECK: No pain or stiffness  RESPIRATORY: No cough; No shortness of breath  CARDIOVASCULAR: No chest pain, no palpitations  GASTROINTESTINAL: No pain. No nausea or vomiting; one time loose BM  NEUROLOGICAL: No headache or numbness, no tremors  MUSCULOSKELETAL: No joint pain, no muscle pain  GENITOURINARY: no dysuria, no frequency, no hesitancy  PSYCHIATRY: no depression , no anxiety  ALL OTHER  ROS negative        Vital Signs Last 24 Hrs  T(C): 36.7 (01 Aug 2024 05:00), Max: 36.7 (31 Jul 2024 13:26)  T(F): 98 (01 Aug 2024 05:00), Max: 98.1 (31 Jul 2024 13:26)  HR: 60 (01 Aug 2024 05:46) (60 - 67)  BP: 126/61 (01 Aug 2024 05:46) (109/68 - 149/59)  BP(mean): 80 (01 Aug 2024 05:46) (80 - 105)  RR: 18 (01 Aug 2024 05:00) (18 - 18)  SpO2: 98% (01 Aug 2024 05:00) (98% - 99%)    Parameters below as of 01 Aug 2024 05:00  Patient On (Oxygen Delivery Method): room air        ________________________________________________  PHYSICAL EXAM:  GENERAL: NAD  HEENT: Normocephalic;  conjunctivae and sclerae clear; moist mucous membranes;   NECK : supple  CHEST/LUNG: Clear to auscultation bilaterally with good air entry   HEART: S1 S2  regular; no murmurs, gallops or rubs  ABDOMEN: Soft, Nontender, Nondistended; Bowel sounds present  EXTREMITIES: no cyanosis; trace pedal edema b/l mild ; no calf tenderness  SKIN: warm and dry; no rash  NERVOUS SYSTEM:  Awake and alert; Oriented  to place, person and time ; no new deficits, gait unsteady     _________________________________________________  LABS:    CAPILLARY BLOOD GLUCOSE      POCT Blood Glucose.: 151 mg/dL (01 Aug 2024 08:15)  POCT Blood Glucose.: 227 mg/dL (31 Jul 2024 21:37)  POCT Blood Glucose.: 132 mg/dL (31 Jul 2024 16:49)        RADIOLOGY & ADDITIONAL TESTS:    Imaging  Reviewed:  YES    < from: CT Head No Cont (07.27.24 @ 18:42) >    ACC: [id number] EXAM:  CT BRAIN   ORDERED BY: IVORY ATKINSON DATE:  07/27/2024          INTERPRETATION:  CLINICAL INFORMATION: fall    COMPARISON: None.      TECHNIQUE:  Serial axial images were obtained from the skull base to the   vertex using multi-slice helical technique. Sagittal and coronal   reformats were obtained.    FINDINGS: Small left frontal extracalvarial hematoma. No fracture of the   underlying calvarium.    VENTRICLES AND SULCI: Age appropriate involutional changes.  INTRA-AXIAL: No mass effect, acute hemorrhage, or midline shift.  EXTRA-AXIAL: No mass or fluid collection. Basal cisterns are normal in   appearance. Deposition of calcified plaques in association with the   distal intracranial bilateral vertebral arteries and bilateral carotid   siphons.    VISUALIZED SINUSES:  Bilateral maxillary sinus and bilateral ethmoid air   cell mucosal thickening. No paranasal sinus air-fluid levels.  TYMPANOMASTOID CAVITIES:  Clear.  VISUALIZED ORBITS: Appearance of the bilateral optic lenses suggests the   patient has previously undergone prior cataract surgery.  CALVARIUM: Intact.    MISCELLANEOUS: None.      IMPRESSION:  Small left frontal extracalvarial hematoma. No acute intracranial   hemorrhage, mass effect, or midline shift. If patient demonstrates   persistent headaches or altered mental status, consider further   evaluation via MR imaging to include DWI and ADC mapping techniques,   along with gradient echo acquisition technique, provided there are no   contraindications.        --- End of Report ---            DAWN BEHR-VENTURA MD; Attending Radiologist  This document has been electronically signed. Jul 27 2024  6:54PM    < end of copied text >    < from: Xray Pelvis AP only (07.27.24 @ 15:41) >    ACC: [id number] EXAM:  XR PELVIS AP ONLY 1-2 VIEWS   ORDERED BY: IVORY DOMINGUEZ     PROCEDURE DATE:  07/27/2024          INTERPRETATION:  Pelvis. Patient had a fall.    There is degenerative loss of disc height in the lower lumbar levels.   There is mild symmetric hip degeneration. No fracture.    There is a 1 cm density involving the right iliac wing likely a bone   island.    IMPRESSION: No acute finding. Probable bone island in the right iliac   bone. Degeneration particularly in the lower lumbar spine.    --- End of Report ---    CÉSAR FREEMAN MD; Attending Radiologist  This document has been electronically signed. Jul 27 2024  5:45PM    < end of copied text >  Consultant(s) Notes Reviewed:   YES      Plan of care was discussed with patient and /or primary care giver; all questions and concerns were addressed

## 2024-08-01 NOTE — PROGRESS NOTE ADULT - PROBLEM SELECTOR PROBLEM 7
[facility] Call Center    Phone Message    May a detailed message be left on voicemail: no     Reason for Call: Other: Mom returning call regarding appt that needs to be rescheduled.  not sure if both appts on that day or just with Dr Serna. Please advise.      Action Taken: Message routed to:  Pediatric Clinics: Weight Management p [phone]    Travel Screening: Not Applicable                                                                      
See duplicate encounter.  Gwendolyn Dias RN    
Discharge planning issues
Diabetes mellitus

## 2024-08-01 NOTE — PROGRESS NOTE ADULT - ASSESSMENT
89 year old male patient with pmhx DM, prostate cancer, HTN, HLD who presented to the ER after falling twice.The patient was at home and bent down to  a coin and fell forward. He later went to the store and coming home he was bending down with his grocery bags and again fell. He was found by someone who helped him back home by giving him a ride. His neighbor is a nurse and told him to go to the ER since he fell twice. The patient denies any lightheadedness, dizziness, orthostatic lightheadedness, palpitations, or any unusual signs before falling. He states he has imbalance chronically.    # Falls  # Ambulatory Dysfunction  > Inability to ambulate in the ER (fell when ambulation attempted at bedside)  > U/A with uric acid crystals but otherwise negative; patient denies any pain  - PT Evaluation - rec EDUARD - CM consult  - Fall Precautions  - fu Orthostatic Vital Signs    # Uric acid crystals on U/A  - Outpatient follow up    # Reactive Leukocytosis  2/2 Fall  > U/A negative, no respiratory symptoms, infection not suspected   - Resolved    # Hx of DM  - Insulin Sliding Scale  - Blood Glucose Monitoring  - Can monitor blood glucose for 24 hours before starting long acting insulin if needed    # Hx of Prostate Cancer, HLD, HTN  - Continue home medications Bicalumatide, Statin, Lisinopril    # DVT PPx  - Heparin    # FEN  - Diabetic DASH Diet  - Monitor and replete electrolytes as needed  - Aspiration Precautions  - Fall Precautions
Pt is a 89-year-old male, AAOx3, ambulates with a cane intermittently, w/ PMHx of DM, prostate cancer, presenting to the ED after 2 falls at home and near the grocery store earlier today. Per pt, he bent over to  a coin that fell to the ground and fell the first instance of falling. Later on in the day, he was bending down with his grocery bags and similarly fell again  He denies any dizziness, vertigo, lightheadedness, syncope, or LOC. Does endorse hitting his R forearm, where minor ecchymosis is noted on exam. However, ROM is fully intact. On exam, patient is comfortable when laying down, however on multiple attempts to ambulate, patient has unsteady gait. Romberg sign +. Denies any neuropathy. Pt has daughter who helps manage his care, she is planning to come in the next 2 days to take pt to [place] for further workup of his prostate cancer.    Pt is admitted to medicine for ambulatory dysfunction s/p fall.   Pt with hyperglycemia. denies hx of Diabetes. A1c  7.4.  started metformin.   Pt reports watery BM this morning. no c/o pain, N/V. Currently not on bowel regimen.     PT reccs EDUARD, pending auth. CM following.           
Pt is a 89-year-old male, AAOx3, ambulates with a cane intermittently, w/ PMHx of DM, prostate cancer, presenting to the ED after 2 falls at home and near the grocery store earlier today. Per pt, he bent over to  a coin that fell to the ground and fell the first instance of falling. Later on in the day, he was bending down with his grocery bags and similarly fell again  He denies any dizziness, vertigo, lightheadedness, syncope, or LOC. Does endorse hitting his R forearm, where minor ecchymosis is noted on exam. However, ROM is fully intact. On exam, patient is comfortable when laying down, however on multiple attempts to ambulate, patient has unsteady gait. Romberg sign +. Denies any neuropathy. Pt has daughter who helps manage his care, she is planning to come in the next 2 days to take pt to [place] for further workup of his prostate cancer. Admitted to medicine for ambulatory dysfunction s/p fall.   noted with elevated glucose, denies hx of Diabetes.A1c noted elevated, 7.4 will start metformin 500bid. pt reccs EDUARD, pending auth            
Pt is a 89-year-old male, AAOx3, ambulates with a cane intermittently, w/ PMHx of DM, prostate cancer, presenting to the ED after 2 falls at home and near the grocery store earlier today. Per pt, he bent over to  a coin that fell to the ground and fell the first instance of falling. Later on in the day, he was bending down with his grocery bags and similarly fell again  He denies any dizziness, vertigo, lightheadedness, syncope, or LOC. Does endorse hitting his R forearm, where minor ecchymosis is noted on exam. However, ROM is fully intact. On exam, patient is comfortable when laying down, however on multiple attempts to ambulate, patient has unsteady gait. Romberg sign +. Denies any neuropathy. Pt has daughter who helps manage his care, she is planning to come in the next 2 days to take pt to [place] for further workup of his prostate cancer.    Pt is admitted to medicine for ambulatory dysfunction s/p fall.   Pt with hyperglycemia. denies hx of Diabetes. A1c  7.4.  started metformin.   PT reccs EDUARD, pending auth. CM following.           
Pt is a 89-year-old male, AAOx3, ambulates with a cane intermittently, w/ PMHx of DM, prostate cancer, presenting to the ED after 2 falls at home and near the grocery store earlier today. Per pt, he bent over to  a coin that fell to the ground and fell the first instance of falling. Later on in the day, he was bending down with his grocery bags and similarly fell again  He denies any dizziness, vertigo, lightheadedness, syncope, or LOC. Does endorse hitting his R forearm, where minor ecchymosis is noted on exam. However, ROM is fully intact. On exam, patient is comfortable when laying down, however on multiple attempts to ambulate, patient has unsteady gait. Romberg sign +. Denies any neuropathy. Pt has daughter who helps manage his care, she is planning to come in the next 2 days to take pt to [place] for further workup of his prostate cancer. Admitted to medicine for ambulatory dysfunction s/p fall.   noted with elevated glucose, denies hx of Diabetes. will obtain A1c. pt reccs EDUARD however patient refuses and would like to return home.

## 2024-08-01 NOTE — PROGRESS NOTE ADULT - PROBLEM SELECTOR PLAN 2
c/w home med Bicalumatide 50 mg qd and Flomax
s/p fall x2  CTH: Small left frontal extracalvarial hematoma. if patient demonstrates persistent headaches or altered mental status, consider further evaluation  Pelvic XR: no remarkable findings, lower lumbar spine degeneration  on exam, Romberg sign +, unsteady gait  PT: EDUARD ,

## 2024-08-01 NOTE — PROGRESS NOTE ADULT - PROBLEM SELECTOR PLAN 7
-pt lives alone with 3-4 steps in home  -pt reccs EDUARD,  pending auth
-pt lives alone with 3-4 steps in home  -pt reccs EDUARD,  pending auth, CM following
-pt lives alone with 3-4 steps in home  -pt reccs EDUARD, however patient refuses  -daughter to arrive from [place] on Wed to care for patient
patient denies hx of DM,   c/w ISS with meals   A1C 7.4  start metformin 500bid